# Patient Record
Sex: FEMALE | Race: WHITE | NOT HISPANIC OR LATINO | URBAN - METROPOLITAN AREA
[De-identification: names, ages, dates, MRNs, and addresses within clinical notes are randomized per-mention and may not be internally consistent; named-entity substitution may affect disease eponyms.]

---

## 2017-05-31 ENCOUNTER — OUTPATIENT (OUTPATIENT)
Dept: OUTPATIENT SERVICES | Facility: HOSPITAL | Age: 77
LOS: 1 days | End: 2017-05-31

## 2018-01-03 ENCOUNTER — NON-APPOINTMENT (OUTPATIENT)
Age: 78
End: 2018-01-03

## 2018-01-03 ENCOUNTER — APPOINTMENT (OUTPATIENT)
Dept: CARDIOLOGY | Facility: CLINIC | Age: 78
End: 2018-01-03
Payer: MEDICARE

## 2018-01-03 VITALS — DIASTOLIC BLOOD PRESSURE: 80 MMHG | SYSTOLIC BLOOD PRESSURE: 150 MMHG

## 2018-01-03 VITALS
WEIGHT: 169 LBS | SYSTOLIC BLOOD PRESSURE: 182 MMHG | HEIGHT: 67 IN | DIASTOLIC BLOOD PRESSURE: 110 MMHG | BODY MASS INDEX: 26.53 KG/M2 | HEART RATE: 60 BPM

## 2018-01-03 DIAGNOSIS — Z86.39 PERSONAL HISTORY OF OTHER ENDOCRINE, NUTRITIONAL AND METABOLIC DISEASE: ICD-10-CM

## 2018-01-03 DIAGNOSIS — Z86.79 PERSONAL HISTORY OF OTHER DISEASES OF THE CIRCULATORY SYSTEM: ICD-10-CM

## 2018-01-03 DIAGNOSIS — Z00.00 ENCOUNTER FOR GENERAL ADULT MEDICAL EXAMINATION W/OUT ABNORMAL FINDINGS: ICD-10-CM

## 2018-01-03 PROCEDURE — 99215 OFFICE O/P EST HI 40 MIN: CPT

## 2018-01-03 PROCEDURE — 93000 ELECTROCARDIOGRAM COMPLETE: CPT

## 2018-01-22 ENCOUNTER — APPOINTMENT (OUTPATIENT)
Dept: CARDIOLOGY | Facility: CLINIC | Age: 78
End: 2018-01-22
Payer: MEDICARE

## 2018-01-22 PROCEDURE — 78452 HT MUSCLE IMAGE SPECT MULT: CPT

## 2018-01-22 PROCEDURE — A9502: CPT

## 2018-01-22 PROCEDURE — 93306 TTE W/DOPPLER COMPLETE: CPT

## 2018-01-22 PROCEDURE — 93015 CV STRESS TEST SUPVJ I&R: CPT

## 2018-01-24 ENCOUNTER — APPOINTMENT (OUTPATIENT)
Dept: CARDIOLOGY | Facility: CLINIC | Age: 78
End: 2018-01-24
Payer: MEDICARE

## 2018-01-24 VITALS
HEART RATE: 70 BPM | WEIGHT: 169 LBS | BODY MASS INDEX: 26.53 KG/M2 | HEIGHT: 67 IN | OXYGEN SATURATION: 98 % | DIASTOLIC BLOOD PRESSURE: 96 MMHG | SYSTOLIC BLOOD PRESSURE: 220 MMHG

## 2018-01-24 DIAGNOSIS — Z78.9 OTHER SPECIFIED HEALTH STATUS: ICD-10-CM

## 2018-01-24 DIAGNOSIS — Z82.49 FAMILY HISTORY OF ISCHEMIC HEART DISEASE AND OTHER DISEASES OF THE CIRCULATORY SYSTEM: ICD-10-CM

## 2018-01-24 DIAGNOSIS — Z82.3 FAMILY HISTORY OF STROKE: ICD-10-CM

## 2018-01-24 PROCEDURE — 99214 OFFICE O/P EST MOD 30 MIN: CPT

## 2018-02-05 ENCOUNTER — RESULT CHARGE (OUTPATIENT)
Age: 78
End: 2018-02-05

## 2018-02-23 ENCOUNTER — RECORD ABSTRACTING (OUTPATIENT)
Age: 78
End: 2018-02-23

## 2018-02-23 DIAGNOSIS — R07.9 CHEST PAIN, UNSPECIFIED: ICD-10-CM

## 2018-02-23 DIAGNOSIS — R53.82 CHRONIC FATIGUE, UNSPECIFIED: ICD-10-CM

## 2018-03-01 ENCOUNTER — APPOINTMENT (OUTPATIENT)
Dept: CARDIOLOGY | Facility: CLINIC | Age: 78
End: 2018-03-01

## 2018-07-19 ENCOUNTER — RECORD ABSTRACTING (OUTPATIENT)
Age: 78
End: 2018-07-19

## 2018-07-19 ENCOUNTER — APPOINTMENT (OUTPATIENT)
Dept: CARDIOLOGY | Facility: CLINIC | Age: 78
End: 2018-07-19
Payer: MEDICARE

## 2018-07-19 VITALS
BODY MASS INDEX: 26.06 KG/M2 | WEIGHT: 166 LBS | DIASTOLIC BLOOD PRESSURE: 74 MMHG | HEART RATE: 66 BPM | SYSTOLIC BLOOD PRESSURE: 118 MMHG | OXYGEN SATURATION: 96 % | HEIGHT: 67 IN

## 2018-07-19 DIAGNOSIS — R00.2 PALPITATIONS: ICD-10-CM

## 2018-07-19 DIAGNOSIS — K21.9 GASTRO-ESOPHAGEAL REFLUX DISEASE W/OUT ESOPHAGITIS: ICD-10-CM

## 2018-07-19 PROCEDURE — 99214 OFFICE O/P EST MOD 30 MIN: CPT

## 2018-07-19 PROCEDURE — 93000 ELECTROCARDIOGRAM COMPLETE: CPT

## 2018-07-19 RX ORDER — SIMVASTATIN 20 MG/1
20 TABLET, FILM COATED ORAL DAILY
Qty: 90 | Refills: 0 | Status: ACTIVE | COMMUNITY

## 2018-07-19 RX ORDER — LOSARTAN POTASSIUM 100 MG/1
100 TABLET, FILM COATED ORAL DAILY
Qty: 90 | Refills: 0 | Status: ACTIVE | COMMUNITY

## 2018-07-19 RX ORDER — HYDROCHLOROTHIAZIDE AND TRIAMTERENE 25; 37.5 MG/1; MG/1
37.5-25 CAPSULE ORAL DAILY
Refills: 0 | Status: DISCONTINUED | COMMUNITY
End: 2018-07-19

## 2018-07-19 RX ORDER — ASPIRIN ENTERIC COATED TABLETS 81 MG 81 MG/1
81 TABLET, DELAYED RELEASE ORAL DAILY
Refills: 0 | Status: ACTIVE | COMMUNITY
Start: 2018-07-19

## 2018-07-19 RX ORDER — ASPIRIN 81 MG
81 TABLET, DELAYED RELEASE (ENTERIC COATED) ORAL
Refills: 0 | Status: DISCONTINUED | COMMUNITY
End: 2018-07-19

## 2018-07-19 RX ORDER — DIPHENOXYLATE HYDROCHLORIDE AND ATROPINE SULFATE 2.5; .025 MG/1; MG/1
TABLET ORAL
Refills: 0 | Status: DISCONTINUED | COMMUNITY
End: 2018-07-19

## 2018-10-02 ENCOUNTER — APPOINTMENT (OUTPATIENT)
Dept: CARDIOLOGY | Facility: CLINIC | Age: 78
End: 2018-10-02
Payer: MEDICARE

## 2018-10-02 PROCEDURE — 93880 EXTRACRANIAL BILAT STUDY: CPT

## 2018-10-02 PROCEDURE — 93306 TTE W/DOPPLER COMPLETE: CPT

## 2018-10-05 PROCEDURE — 93224 XTRNL ECG REC UP TO 48 HRS: CPT

## 2018-10-08 ENCOUNTER — RECORD ABSTRACTING (OUTPATIENT)
Age: 78
End: 2018-10-08

## 2018-10-11 ENCOUNTER — APPOINTMENT (OUTPATIENT)
Dept: CARDIOLOGY | Facility: CLINIC | Age: 78
End: 2018-10-11
Payer: MEDICARE

## 2018-10-11 VITALS
BODY MASS INDEX: 26.06 KG/M2 | WEIGHT: 166 LBS | HEIGHT: 67 IN | DIASTOLIC BLOOD PRESSURE: 70 MMHG | HEART RATE: 68 BPM | OXYGEN SATURATION: 97 % | SYSTOLIC BLOOD PRESSURE: 112 MMHG

## 2018-10-11 DIAGNOSIS — Z87.898 PERSONAL HISTORY OF OTHER SPECIFIED CONDITIONS: ICD-10-CM

## 2018-10-11 PROCEDURE — 99214 OFFICE O/P EST MOD 30 MIN: CPT

## 2019-02-14 ENCOUNTER — OUTPATIENT (OUTPATIENT)
Dept: OUTPATIENT SERVICES | Facility: HOSPITAL | Age: 79
LOS: 1 days | End: 2019-02-14

## 2019-03-28 ENCOUNTER — OUTPATIENT (OUTPATIENT)
Dept: OUTPATIENT SERVICES | Facility: HOSPITAL | Age: 79
LOS: 1 days | End: 2019-03-28

## 2019-04-01 ENCOUNTER — RECORD ABSTRACTING (OUTPATIENT)
Age: 79
End: 2019-04-01

## 2019-04-03 ENCOUNTER — APPOINTMENT (OUTPATIENT)
Dept: CARDIOLOGY | Facility: CLINIC | Age: 79
End: 2019-04-03

## 2020-01-13 ENCOUNTER — NON-APPOINTMENT (OUTPATIENT)
Age: 80
End: 2020-01-13

## 2020-01-13 ENCOUNTER — APPOINTMENT (OUTPATIENT)
Dept: CARDIOLOGY | Facility: CLINIC | Age: 80
End: 2020-01-13
Payer: MEDICARE

## 2020-01-13 VITALS
DIASTOLIC BLOOD PRESSURE: 68 MMHG | SYSTOLIC BLOOD PRESSURE: 116 MMHG | WEIGHT: 171 LBS | BODY MASS INDEX: 26.84 KG/M2 | OXYGEN SATURATION: 97 % | HEIGHT: 67 IN | HEART RATE: 87 BPM

## 2020-01-13 PROCEDURE — 99214 OFFICE O/P EST MOD 30 MIN: CPT

## 2020-01-13 PROCEDURE — 93000 ELECTROCARDIOGRAM COMPLETE: CPT

## 2020-01-13 RX ORDER — OMEPRAZOLE 20 MG/1
20 CAPSULE, DELAYED RELEASE ORAL DAILY
Qty: 90 | Refills: 1 | Status: DISCONTINUED | COMMUNITY
End: 2020-01-13

## 2020-01-13 RX ORDER — HYDROCHLOROTHIAZIDE 12.5 MG/1
12.5 TABLET ORAL DAILY
Qty: 90 | Refills: 1 | Status: DISCONTINUED | COMMUNITY
Start: 2018-07-19 | End: 2020-01-13

## 2020-07-27 ENCOUNTER — OUTPATIENT (OUTPATIENT)
Dept: OUTPATIENT SERVICES | Facility: HOSPITAL | Age: 80
LOS: 1 days | End: 2020-07-27

## 2020-08-07 ENCOUNTER — APPOINTMENT (OUTPATIENT)
Dept: DISASTER EMERGENCY | Facility: CLINIC | Age: 80
End: 2020-08-07

## 2020-10-05 ENCOUNTER — APPOINTMENT (OUTPATIENT)
Dept: CARDIOLOGY | Facility: CLINIC | Age: 80
End: 2020-10-05

## 2020-10-16 ENCOUNTER — APPOINTMENT (OUTPATIENT)
Dept: CARDIOLOGY | Facility: CLINIC | Age: 80
End: 2020-10-16
Payer: MEDICARE

## 2020-10-16 ENCOUNTER — NON-APPOINTMENT (OUTPATIENT)
Age: 80
End: 2020-10-16

## 2020-10-16 VITALS — DIASTOLIC BLOOD PRESSURE: 90 MMHG | SYSTOLIC BLOOD PRESSURE: 176 MMHG

## 2020-10-16 VITALS
TEMPERATURE: 98.6 F | OXYGEN SATURATION: 95 % | SYSTOLIC BLOOD PRESSURE: 200 MMHG | HEART RATE: 81 BPM | HEIGHT: 67 IN | BODY MASS INDEX: 27.47 KG/M2 | DIASTOLIC BLOOD PRESSURE: 96 MMHG | WEIGHT: 175 LBS

## 2020-10-16 PROCEDURE — 93000 ELECTROCARDIOGRAM COMPLETE: CPT

## 2020-10-16 PROCEDURE — 99215 OFFICE O/P EST HI 40 MIN: CPT | Mod: 25

## 2020-10-16 NOTE — DISCUSSION/SUMMARY
[FreeTextEntry1] : 1. HTN - uncontrolled. Recommend continuing Norvasc 5 mg PO daily and losartan 100 mg PO daily. Recommend increasing Toprol XL from 50mg daily back to BID dosing (high risk medication with no signs of toxicity). \par \par 2. HLD - on simvastatin 20 mg PO daily.\par \par 3. Mild MR, mild AI: periodic echo surveillance.\par \par 4. Atherosclerosis of the carotid artery disease: no hx of CVA. Continue simvastatin 20mg daily.\par \par 5. PACs: asymptomatic. On beta blocker therapy. \par \par Follow up in 2 months.

## 2020-10-16 NOTE — HISTORY OF PRESENT ILLNESS
[FreeTextEntry1] : Historical Perspective:\par 80 year old female with PMHx of HTN, HLD, PACs and mild valvular regurgitation presents for routine follow up.  Previously she was noted to have mild-moderate AI/MR in the setting of poorly controlled BP.  After control of BP, echo was repeated which revealed only mild valvular disease.  \par \par Current Health Status:\par Patient with no chest pain, SOB, or palpitations. No hospitalizations since seeing me last. Remains compliant with his medications and reports no adverse effects. Patient states that over the past few months her BP has been elevated. She feels anxious and believes this to be the reason why her BP spikes high at times. She states she had her PCP increase her Toprol XL from 50mg daily to BID dosing. She did this and her BP became well controlled. Patient then thought she could reduce the dose back down to daily dosing because her BP was so well controlled.

## 2020-10-16 NOTE — PHYSICAL EXAM
[General Appearance - Well Developed] : well developed [Normal Appearance] : normal appearance [Well Groomed] : well groomed [General Appearance - Well Nourished] : well nourished [No Deformities] : no deformities [General Appearance - In No Acute Distress] : no acute distress [Normal Conjunctiva] : the conjunctiva exhibited no abnormalities [Normal Oral Mucosa] : normal oral mucosa [Eyelids - No Xanthelasma] : the eyelids demonstrated no xanthelasmas [No Oral Pallor] : no oral pallor [No Oral Cyanosis] : no oral cyanosis [Respiration, Rhythm And Depth] : normal respiratory rhythm and effort [Exaggerated Use Of Accessory Muscles For Inspiration] : no accessory muscle use [Heart Rate And Rhythm] : heart rate and rhythm were normal [Auscultation Breath Sounds / Voice Sounds] : lungs were clear to auscultation bilaterally [Heart Sounds] : normal S1 and S2 [Murmurs] : no murmurs present [Abnormal Walk] : normal gait [Nail Clubbing] : no clubbing of the fingernails [Gait - Sufficient For Exercise Testing] : the gait was sufficient for exercise testing [Cyanosis, Localized] : no localized cyanosis [Petechial Hemorrhages (___cm)] : no petechial hemorrhages [] : no rash [Skin Color & Pigmentation] : normal skin color and pigmentation [No Venous Stasis] : no venous stasis [Skin Lesions] : no skin lesions [No Skin Ulcers] : no skin ulcer [No Xanthoma] : no  xanthoma was observed [Oriented To Time, Place, And Person] : oriented to person, place, and time [Affect] : the affect was normal [Mood] : the mood was normal [No Anxiety] : not feeling anxious [Edema] : no peripheral edema present [FreeTextEntry1] : No JVD, no carotid artery bruits auscultated bilaterally

## 2020-11-02 ENCOUNTER — APPOINTMENT (OUTPATIENT)
Dept: CARDIOLOGY | Facility: CLINIC | Age: 80
End: 2020-11-02
Payer: MEDICARE

## 2020-11-02 PROCEDURE — 93306 TTE W/DOPPLER COMPLETE: CPT

## 2020-12-14 ENCOUNTER — APPOINTMENT (OUTPATIENT)
Dept: CARDIOLOGY | Facility: CLINIC | Age: 80
End: 2020-12-14
Payer: MEDICARE

## 2020-12-14 VITALS — DIASTOLIC BLOOD PRESSURE: 84 MMHG | SYSTOLIC BLOOD PRESSURE: 184 MMHG

## 2020-12-14 VITALS
HEIGHT: 67 IN | OXYGEN SATURATION: 97 % | BODY MASS INDEX: 28.41 KG/M2 | TEMPERATURE: 96.9 F | SYSTOLIC BLOOD PRESSURE: 196 MMHG | DIASTOLIC BLOOD PRESSURE: 90 MMHG | WEIGHT: 181 LBS | HEART RATE: 66 BPM

## 2020-12-14 PROCEDURE — 99215 OFFICE O/P EST HI 40 MIN: CPT

## 2020-12-14 RX ORDER — LABETALOL HYDROCHLORIDE 200 MG/1
200 TABLET, FILM COATED ORAL
Qty: 180 | Refills: 3 | Status: ACTIVE | COMMUNITY
Start: 2020-12-14 | End: 1900-01-01

## 2020-12-14 RX ORDER — METOPROLOL SUCCINATE 50 MG/1
50 TABLET, EXTENDED RELEASE ORAL TWICE DAILY
Refills: 1 | Status: DISCONTINUED | COMMUNITY
End: 2020-12-14

## 2020-12-14 NOTE — HISTORY OF PRESENT ILLNESS
[FreeTextEntry1] : Historical Perspective:\par 80 year old female with PMHx of HTN, HLD, PACs and mild valvular regurgitation presents for routine follow up.  Previously she was noted to have mild-moderate AI/MR in the setting of poorly controlled BP.  After control of BP, echo was repeated which revealed only mild valvular disease.  \par \par Current Health Status:\par Patient with no chest pain, SOB, or palpitations. No hospitalizations since seeing me last. Remains compliant with his medications and reports no adverse effects. BP remains elevated.

## 2020-12-14 NOTE — DISCUSSION/SUMMARY
[FreeTextEntry1] : 1. HTN - uncontrolled. Recommend continuing Norvasc 5 mg PO daily and losartan 100 mg PO daily. Recommend discontinuing Toprol XL 50mg BID dosing (high risk medication with no signs of toxicity). Instead of metoprolol will prescribe Labetalol 200mg BID.\par \par 2. HLD - on simvastatin 20 mg PO daily.\par \par 3. Mild MR, mild-moderate AI: periodic echo surveillance.\par \par 4. Atherosclerosis of the carotid artery disease: no hx of CVA. Continue simvastatin 20mg daily.\par \par 5. PACs: asymptomatic. On beta blocker therapy. \par \par Follow up in 3 weeks for BP check.

## 2021-01-11 ENCOUNTER — APPOINTMENT (OUTPATIENT)
Dept: CARDIOLOGY | Facility: CLINIC | Age: 81
End: 2021-01-11

## 2021-04-06 ENCOUNTER — APPOINTMENT (OUTPATIENT)
Dept: CARDIOLOGY | Facility: CLINIC | Age: 81
End: 2021-04-06

## 2021-09-10 ENCOUNTER — NON-APPOINTMENT (OUTPATIENT)
Age: 81
End: 2021-09-10

## 2021-09-10 ENCOUNTER — APPOINTMENT (OUTPATIENT)
Dept: OPHTHALMOLOGY | Facility: CLINIC | Age: 81
End: 2021-09-10
Payer: MEDICARE

## 2021-09-10 PROCEDURE — 92014 COMPRE OPH EXAM EST PT 1/>: CPT

## 2022-04-22 ENCOUNTER — NON-APPOINTMENT (OUTPATIENT)
Age: 82
End: 2022-04-22

## 2022-04-22 ENCOUNTER — APPOINTMENT (OUTPATIENT)
Dept: CARDIOLOGY | Facility: CLINIC | Age: 82
End: 2022-04-22
Payer: MEDICARE

## 2022-04-22 VITALS
SYSTOLIC BLOOD PRESSURE: 196 MMHG | BODY MASS INDEX: 27.47 KG/M2 | HEART RATE: 68 BPM | OXYGEN SATURATION: 96 % | WEIGHT: 175 LBS | HEIGHT: 67 IN | DIASTOLIC BLOOD PRESSURE: 96 MMHG

## 2022-04-22 DIAGNOSIS — R94.31 ABNORMAL ELECTROCARDIOGRAM [ECG] [EKG]: ICD-10-CM

## 2022-04-22 DIAGNOSIS — I49.1 ATRIAL PREMATURE DEPOLARIZATION: ICD-10-CM

## 2022-04-22 PROCEDURE — 99215 OFFICE O/P EST HI 40 MIN: CPT | Mod: 25

## 2022-04-22 PROCEDURE — 93000 ELECTROCARDIOGRAM COMPLETE: CPT

## 2022-04-22 RX ORDER — AMLODIPINE BESYLATE 5 MG/1
5 TABLET ORAL
Qty: 90 | Refills: 1 | Status: DISCONTINUED | COMMUNITY
Start: 2018-01-24 | End: 2022-04-22

## 2022-04-22 NOTE — CARDIOLOGY SUMMARY
[de-identified] : 4/22/2022, NSR, non-specific T wave changes. [de-identified] : 11/2/2020, mild MR, mild-moderate AI, mild TR with estimated PASP of 42mmmHg. LVEF 60-65%.  [de-identified] : 10/2/2018, Carotid Duplex: mild non-obstructive ICA disease bilaterally.

## 2022-04-22 NOTE — REVIEW OF SYSTEMS
[Joint Pain] : joint pain [Joint Stiffness] : joint stiffness [Negative] : Neurological [FreeTextEntry5] : No carotid bruits auscultated bilaterally

## 2022-04-22 NOTE — HISTORY OF PRESENT ILLNESS
[FreeTextEntry1] : Historical Perspective:\par 82 year old female with PMHx of HTN, HLD, PACs and mild valvular regurgitation presents for routine follow up. Previously she was noted to have mild-moderate AI/MR in the setting of poorly controlled BP. After control of BP, echo was repeated which revealed only mild valvular disease. \par \par Current Health Status:\par Patient with no chest pain, SOB, or palpitations. No hospitalizations since seeing me last. Remains compliant with his medications and reports no adverse effects. BP remains elevated. Non-compliant with office follow ups.\par

## 2022-04-22 NOTE — DISCUSSION/SUMMARY
[FreeTextEntry1] : 1. HTN - uncontrolled. Recommend discontinuing Norvasc 5 mg PO daily and starting Nifedipine ER 30mg daily. Continue losartan 100mg daily and Labetalol 200mg BID (high risk medication with no signs of toxicity). Recommend low salt diet. Patient with resistant HTN. Recommend CTA of the abdomen with contrast to evaluate for BALAJI.\par \par 2. HLD - on simvastatin 20 mg PO daily.\par \par 3. Mild MR, mild-moderate AI: periodic echo surveillance.\par \par 4. Atherosclerosis of the carotid artery disease: no hx of CVA. Continue simvastatin 20mg daily.\par \par 5. PACs: asymptomatic. On beta blocker therapy. \par \par Follow up in 6 weeks.

## 2022-04-22 NOTE — PHYSICAL EXAM
[Normal] : moves all extremities, no focal deficits, normal speech [de-identified] : No carotid bruits auscultated bilaterally

## 2022-04-27 ENCOUNTER — NON-APPOINTMENT (OUTPATIENT)
Age: 82
End: 2022-04-27

## 2022-05-13 ENCOUNTER — APPOINTMENT (OUTPATIENT)
Dept: CARDIOLOGY | Facility: CLINIC | Age: 82
End: 2022-05-13
Payer: MEDICARE

## 2022-05-13 PROCEDURE — 93306 TTE W/DOPPLER COMPLETE: CPT

## 2022-05-27 ENCOUNTER — APPOINTMENT (OUTPATIENT)
Dept: CARDIOLOGY | Facility: CLINIC | Age: 82
End: 2022-05-27
Payer: MEDICARE

## 2022-05-27 VITALS
DIASTOLIC BLOOD PRESSURE: 86 MMHG | HEIGHT: 67 IN | HEART RATE: 65 BPM | BODY MASS INDEX: 27.47 KG/M2 | SYSTOLIC BLOOD PRESSURE: 160 MMHG | OXYGEN SATURATION: 96 % | WEIGHT: 175 LBS

## 2022-05-27 PROCEDURE — 99215 OFFICE O/P EST HI 40 MIN: CPT

## 2022-05-27 NOTE — CARDIOLOGY SUMMARY
[de-identified] : 4/22/2022, NSR, non-specific T wave changes. [de-identified] : 5/13/2022, LV EF 60%, mild MR, mild-moderate AI, mild TR with estimated PASP of 25mmHg.\par 11/2/2020, mild MR, mild-moderate AI, mild TR with estimated PASP of 42mmmHg. LVEF 60-65%.  [de-identified] : 10/2/2018, Carotid Duplex: mild non-obstructive ICA disease bilaterally.

## 2022-05-27 NOTE — PHYSICAL EXAM
[Normal] : moves all extremities, no focal deficits, normal speech [de-identified] : No carotid bruits auscultated bilaterally

## 2022-05-27 NOTE — HISTORY OF PRESENT ILLNESS
[FreeTextEntry1] : Historical Perspective:\par 82 year old female with PMHx of HTN, HLD, PACs and mild valvular regurgitation presents for routine follow up. Previously she was noted to have mild-moderate AI/MR in the setting of poorly controlled BP. After control of BP, echo was repeated which revealed only mild valvular disease. \par \par Current Health Status:\par Patient with no chest pain, SOB, or palpitations. No hospitalizations since seeing me last. Remains compliant with his medications and reports no adverse effects. BP at home 110-120s systolic. Nervous when going to doctor visits. Patient underwent CTA of abdomen and pelvis which was negative for renal artery stenosis, however mass in around gallbladder found incidentally. Patient states she follow up with her PCP who ordered MRI of abdomen and patient is going to see oncologist soon.\par

## 2022-05-27 NOTE — DISCUSSION/SUMMARY
[FreeTextEntry1] : 1. HTN - controlled at home. Component of white coat HTN.  Recommend continuing Nifedipine ER 30mg daily. Continue losartan 100mg daily and Labetalol 200mg BID (high risk medication with no signs of toxicity). Recommend low salt diet. Patient with resistant HTN. Recommended CTA of the abdomen with contrast to evaluate for BALAJI.  Patient underwent CTA of abdomen and pelvis which was negative for renal artery stenosis, however mass in around gallbladder found incidentally. Patient states she follow up with her PCP who ordered MRI of abdomen and patient is going to see oncologist soon.\par \par 2. HLD - on simvastatin 20 mg PO daily.\par \par 3. Mild MR, mild-moderate AI: periodic echo surveillance.\par \par 4. Atherosclerosis of the carotid artery disease: no hx of CVA. Continue simvastatin 20mg daily.\par \par 5. PACs: asymptomatic. On beta blocker therapy. \par \par Follow up in 6 months.

## 2022-05-31 ENCOUNTER — OUTPATIENT (OUTPATIENT)
Dept: OUTPATIENT SERVICES | Facility: HOSPITAL | Age: 82
LOS: 1 days | End: 2022-05-31
Payer: MEDICARE

## 2022-05-31 DIAGNOSIS — K76.89 OTHER SPECIFIED DISEASES OF LIVER: ICD-10-CM

## 2022-06-01 ENCOUNTER — RESULT REVIEW (OUTPATIENT)
Age: 82
End: 2022-06-01

## 2022-06-01 ENCOUNTER — APPOINTMENT (OUTPATIENT)
Dept: HEMATOLOGY ONCOLOGY | Facility: CLINIC | Age: 82
End: 2022-06-01
Payer: MEDICARE

## 2022-06-01 VITALS
BODY MASS INDEX: 27.2 KG/M2 | SYSTOLIC BLOOD PRESSURE: 179 MMHG | HEIGHT: 67 IN | OXYGEN SATURATION: 94 % | TEMPERATURE: 97.2 F | HEART RATE: 64 BPM | DIASTOLIC BLOOD PRESSURE: 83 MMHG | WEIGHT: 173.28 LBS

## 2022-06-01 LAB
BASOPHILS # BLD AUTO: 0.04 K/UL — SIGNIFICANT CHANGE UP (ref 0–0.2)
BASOPHILS NFR BLD AUTO: 0.8 % — SIGNIFICANT CHANGE UP (ref 0–2)
EOSINOPHIL # BLD AUTO: 0.25 K/UL — SIGNIFICANT CHANGE UP (ref 0–0.5)
EOSINOPHIL NFR BLD AUTO: 4.9 % — SIGNIFICANT CHANGE UP (ref 0–6)
HCT VFR BLD CALC: 46.4 % — HIGH (ref 34.5–45)
HGB BLD-MCNC: 15.6 G/DL — HIGH (ref 11.5–15.5)
IMM GRANULOCYTES NFR BLD AUTO: 0.6 % — SIGNIFICANT CHANGE UP (ref 0–1.5)
LYMPHOCYTES # BLD AUTO: 1.24 K/UL — SIGNIFICANT CHANGE UP (ref 1–3.3)
LYMPHOCYTES # BLD AUTO: 24.5 % — SIGNIFICANT CHANGE UP (ref 13–44)
MCHC RBC-ENTMCNC: 31.6 PG — SIGNIFICANT CHANGE UP (ref 27–34)
MCHC RBC-ENTMCNC: 33.6 GM/DL — SIGNIFICANT CHANGE UP (ref 32–36)
MCV RBC AUTO: 94.1 FL — SIGNIFICANT CHANGE UP (ref 80–100)
MONOCYTES # BLD AUTO: 0.47 K/UL — SIGNIFICANT CHANGE UP (ref 0–0.9)
MONOCYTES NFR BLD AUTO: 9.3 % — SIGNIFICANT CHANGE UP (ref 2–14)
NEUTROPHILS # BLD AUTO: 3.04 K/UL — SIGNIFICANT CHANGE UP (ref 1.8–7.4)
NEUTROPHILS NFR BLD AUTO: 59.9 % — SIGNIFICANT CHANGE UP (ref 43–77)
NRBC # BLD: 0 /100 WBCS — SIGNIFICANT CHANGE UP (ref 0–0)
PLATELET # BLD AUTO: 206 K/UL — SIGNIFICANT CHANGE UP (ref 150–400)
RBC # BLD: 4.93 M/UL — SIGNIFICANT CHANGE UP (ref 3.8–5.2)
RBC # FLD: 12.7 % — SIGNIFICANT CHANGE UP (ref 10.3–14.5)
WBC # BLD: 5.07 K/UL — SIGNIFICANT CHANGE UP (ref 3.8–10.5)
WBC # FLD AUTO: 5.07 K/UL — SIGNIFICANT CHANGE UP (ref 3.8–10.5)

## 2022-06-01 PROCEDURE — 85027 COMPLETE CBC AUTOMATED: CPT

## 2022-06-01 PROCEDURE — 99205 OFFICE O/P NEW HI 60 MIN: CPT

## 2022-06-01 PROCEDURE — 36415 COLL VENOUS BLD VENIPUNCTURE: CPT

## 2022-06-01 NOTE — CONSULT LETTER
[Dear  ___] : Dear  [unfilled], [Consult Letter:] : I had the pleasure of evaluating your patient, [unfilled]. [Please see my note below.] : Please see my note below. [Consult Closing:] : Thank you very much for allowing me to participate in the care of this patient.  If you have any questions, please do not hesitate to contact me. [Sincerely,] : Sincerely, [FreeTextEntry2] : Dr. Viki Izquierdo\par  [FreeTextEntry3] : Cornelius Barrera MD\par  [DrJing  ___] : Dr. VILLASENOR

## 2022-06-01 NOTE — HISTORY OF PRESENT ILLNESS
[de-identified] : Ms. Echavarria was recently evaluated for renal artery stenosis. A CT scan was concerning for possible gallbladder or hepatic lesion (no BALAJI). MRI then completed at the end of May, 2022 noted a 2.6 x 2.6cm mild to moderately heterogeneously T2 hyperintense subcapsular lesion in segment 5 which bulged the capsule towards the gallbladder fossa.\par \par Also noted were pancreatic cystic foci measuring up to 1.4 cm in the body.  Hepatic lesion felt to be indeterminate- favored to represent an adenoma but evaluation limited by motion artifact.  Neuroendocrine tumor was listed on the differential. \par \par Ariadna reports no personal or family history of malignancy. She is up to date on age appropriate cancer screening. She reports no unintentional weight loss, no hot flashes, no skin changes, no flushing, no diarrhea. She rarely has a glass of wine and is a non-smoker.

## 2022-06-01 NOTE — PHYSICAL EXAM
[Fully active, able to carry on all pre-disease performance without restriction] : Status 0 - Fully active, able to carry on all pre-disease performance without restriction [Normal] : affect appropriate [de-identified] : anicteric

## 2022-06-01 NOTE — RESULTS/DATA
[FreeTextEntry1] : Ms. Echavarria is a pleasant 82 year-old woman with an incidentally noted hepatic mass seen on CT imaging, confirmed with MRI.

## 2022-06-01 NOTE — REVIEW OF SYSTEMS
[Fever] : no fever [Fatigue] : no fatigue [Recent Change In Weight] : ~T no recent weight change [Dysphagia] : no dysphagia [Odynophagia] : no odynophagia [Chest Pain] : no chest pain [Shortness Of Breath] : no shortness of breath [Abdominal Pain] : no abdominal pain [Joint Pain] : no joint pain [Joint Stiffness] : no joint stiffness [Skin Rash] : no skin rash [Fainting] : no fainting [Anxiety] : no anxiety [Depression] : no depression [Hot Flashes] : no hot flashes [Easy Bleeding] : no tendency for easy bleeding [Easy Bruising] : no tendency for easy bruising [Swollen Glands] : no swollen glands

## 2022-06-28 LAB
AFP-TM SERPL-MCNC: 1.8 NG/ML
ALBUMIN SERPL ELPH-MCNC: 4.4 G/DL
ALP BLD-CCNC: 81 U/L
ALT SERPL-CCNC: 19 U/L
ANION GAP SERPL CALC-SCNC: 13 MMOL/L
APTT BLD: 29.7 SEC
AST SERPL-CCNC: 18 U/L
BILIRUB SERPL-MCNC: 0.5 MG/DL
BUN SERPL-MCNC: 19 MG/DL
CALCIUM SERPL-MCNC: 9.3 MG/DL
CANCER AG19-9 SERPL-ACNC: <2 U/ML
CEA SERPL-MCNC: 3 NG/ML
CGA SERPL-MCNC: 55.6 NG/ML
CHLORIDE SERPL-SCNC: 103 MMOL/L
CO2 SERPL-SCNC: 24 MMOL/L
CREAT SERPL-MCNC: 0.79 MG/DL
EGFR: 75 ML/MIN/1.73M2
GLUCOSE SERPL-MCNC: 129 MG/DL
HBV CORE IGG+IGM SER QL: NONREACTIVE
HBV SURFACE AB SER QL: NONREACTIVE
HBV SURFACE AB SERPL IA-ACNC: <3 MIU/ML
HBV SURFACE AG SER QL: NONREACTIVE
HCV AB SER QL: NONREACTIVE
HCV S/CO RATIO: 0.4 S/CO
INR PPP: 1.14 RATIO
POTASSIUM SERPL-SCNC: 4.3 MMOL/L
PROT SERPL-MCNC: 6.8 G/DL
PT BLD: 13.3 SEC
SEROTONIN SERUM: 102 NG/ML
SODIUM SERPL-SCNC: 141 MMOL/L

## 2022-08-12 ENCOUNTER — OUTPATIENT (OUTPATIENT)
Dept: OUTPATIENT SERVICES | Facility: HOSPITAL | Age: 82
LOS: 1 days | End: 2022-08-12

## 2022-08-12 DIAGNOSIS — K76.89 OTHER SPECIFIED DISEASES OF LIVER: ICD-10-CM

## 2022-08-15 ENCOUNTER — APPOINTMENT (OUTPATIENT)
Dept: HEMATOLOGY ONCOLOGY | Facility: CLINIC | Age: 82
End: 2022-08-15

## 2022-08-15 VITALS
TEMPERATURE: 97.3 F | HEART RATE: 58 BPM | DIASTOLIC BLOOD PRESSURE: 72 MMHG | WEIGHT: 175 LBS | BODY MASS INDEX: 27.41 KG/M2 | SYSTOLIC BLOOD PRESSURE: 135 MMHG | OXYGEN SATURATION: 95 %

## 2022-08-15 PROCEDURE — 99213 OFFICE O/P EST LOW 20 MIN: CPT

## 2022-09-23 ENCOUNTER — NON-APPOINTMENT (OUTPATIENT)
Age: 82
End: 2022-09-23

## 2022-09-23 ENCOUNTER — APPOINTMENT (OUTPATIENT)
Dept: OPHTHALMOLOGY | Facility: CLINIC | Age: 82
End: 2022-09-23

## 2022-09-23 PROCEDURE — 92014 COMPRE OPH EXAM EST PT 1/>: CPT

## 2022-09-23 NOTE — PHYSICAL EXAM
[Fully active, able to carry on all pre-disease performance without restriction] : Status 0 - Fully active, able to carry on all pre-disease performance without restriction [Normal] : affect appropriate [de-identified] : anicteric

## 2022-09-23 NOTE — HISTORY OF PRESENT ILLNESS
[de-identified] : Ms. Echavarria was recently evaluated for renal artery stenosis. A CT scan was concerning for possible gallbladder or hepatic lesion (no BALAJI). MRI then completed at the end of May, 2022 noted a 2.6 x 2.6cm mild to moderately heterogeneously T2 hyperintense subcapsular lesion in segment 5 which bulged the capsule towards the gallbladder fossa.\par \par Also noted were pancreatic cystic foci measuring up to 1.4 cm in the body.  Hepatic lesion felt to be indeterminate- favored to represent an adenoma but evaluation limited by motion artifact.  Neuroendocrine tumor was listed on the differential. \par \par Ariadna reports no personal or family history of malignancy. She is up to date on age appropriate cancer screening. She reports no unintentional weight loss, no hot flashes, no skin changes, no flushing, no diarrhea. She rarely has a glass of wine and is a non-smoker. [de-identified] : CA 19-9, CEA and AFP were all negative at last visit chromogranin A and serotonin levels were also normal.

## 2022-09-30 ENCOUNTER — APPOINTMENT (OUTPATIENT)
Dept: UROGYNECOLOGY | Facility: CLINIC | Age: 82
End: 2022-09-30

## 2022-09-30 VITALS
DIASTOLIC BLOOD PRESSURE: 79 MMHG | BODY MASS INDEX: 27.47 KG/M2 | HEIGHT: 67 IN | WEIGHT: 175 LBS | SYSTOLIC BLOOD PRESSURE: 137 MMHG

## 2022-09-30 DIAGNOSIS — N39.41 URGE INCONTINENCE: ICD-10-CM

## 2022-09-30 PROCEDURE — 81003 URINALYSIS AUTO W/O SCOPE: CPT | Mod: QW

## 2022-09-30 PROCEDURE — 99205 OFFICE O/P NEW HI 60 MIN: CPT

## 2022-09-30 NOTE — PHYSICAL EXAM
[Chaperone Present] : A chaperone was present in the examining room during all aspects of the physical examination [FreeTextEntry1] : General: Not in acute distress, alert and oriented x3.\par Neck: Supple. No lymphadenopathy. \par Abdomen: Soft, nontender, and nondistended. No obvious hepatosplenomegaly. No obvious hernias. \par Pelvic Exam: Normal external female genitalia. Saddle sensory exam S2 to S4 is intact. Perineal reflexes not visualized. Urethra is hypermobile without prolapse, exudates, or lesions. Cough stress test is negative.  Post void residual was checked with I/O cath and was 100 cc of clear urine. Pale and mildly atrophic-appearing vaginal epithelium. No vaginal blood or discharge. Cervix without abnormal lesions, flushed with vagina. Bimanual exam reveals a small uterus in normal positioning. No adnexal masses or tenderness. Levator ani contraction is 1/5. Rectovaginal exam: perineal pocket present. No enterocele or rectal prolapse appreciated. Significantly decreased resting and active anal sphincter tone\par POPQ: Aa -3,  Ba -3, C -6, TVL 9, D-7, GH 3, PB 4, Ap 0, Bp 0.\par \par

## 2022-09-30 NOTE — HISTORY OF PRESENT ILLNESS
[FreeTextEntry1] : Patient is a 82 years old P6 ( x6) who is referred by Dr. Viki Izquierdo for evaluation and management of nocturia and urinary incontinence\par Patient reports waking up 2-3 times at night. Sometimes, she is unable to make it to the restroom and leaks urine. She voids q 2 hrs to avoid leakage during the day time. She wears pads continously \par Denies leaking with coughing, sneezing etc\par She used a medicine which caused dry mouth and didn't help much\par Denies hx of frequent UTIs\par Denies hx of nephrolithiasis\par Daily fluid intake: 1 cup of coffee, water, sometimes flavored water or ice coffee\par Denies sensation of vaginal bulge or pressure. Sexually active without dryness/ pain\par Bowel symptoms: Reports hx of fecal incontinence without any urgency. She is now using diphenoxylate-atropine which has resolved\par \par \par GYN Hx:Denies hx of PMB. Denies hx of abnormal mammo or pap smears. Hasn't seen GYN since past 12 years\par \par PMHx: HTN (TAKES 3-4 PILLS), HLD, undergoing evaluation for gallbladder/ hepatic lesion, aortic valve regurg\par \par PSHx: femur surgery in 2018\par

## 2022-09-30 NOTE — ASSESSMENT
[FreeTextEntry1] : Patient is a 82-year-old multipara with symptoms of urgency urinary incontinence, overactive bladder , nocturia, fecal incontinence and stage II posterior vaginal wall prolapse. On examination, there is evidence of urethral hypermobility with a negative empty bladder supine cough stress test,  postvoid residual of 100 cc, distal posterior vaginal wall prolapse, decrease resting and active anal sphincter tone , and poor levator ani awareness/contraction. She has no history of recurrent UTI. Potential contributing factors include  intake of multiple anti-hypertensive and diuretics.\par \par .

## 2022-09-30 NOTE — LETTER BODY
[Dear  ___] : Dear  [unfilled], [Dear  ___] : Dear ~SCOOBY, [I had the pleasure of evaluating your patient, [unfilled]. Thank you for referring Ms. [unfilled] for consultation for ___] : I had the pleasure of evaluating your patient, [unfilled]. Thank you for referring Ms. [unfilled] for consultation for [unfilled]. [Attached please find my note.] : Attached please find my note. [Thank you very much for allowing me to participate in the care of this patient. If you have any questions, please do not hesitate to contact me] : Thank you very much for allowing me to participate in the care of this patient. If you have any questions, please do not hesitate to contact me.

## 2022-09-30 NOTE — DISCUSSION/SUMMARY
[FreeTextEntry1] : The patient was counseled regarding the pathophysiology of the above conditions. A total of approximately 60 minutes was spent on this visit, greater than 50%of which was spent on counseling. She was also counseled regarding the risks, benefits, indications, and alternatives of further evaluations studies, as well as various management options. She was given verbal and written information/education on pelvic floor muscle exercises, avoidance of dietary bladder irritants, and other strategies to improve bladder control. Pharmacologic management of overactive bladder and urgency incontinence was discussed.  Also reviewed advanced treatment options for overactive bladder . She was counseled regarding treatment options for rectocele and fecal incontinence including pelvic floor PT, pessary placement and surgical management. AUGS interactive tool was used to explain normal anatomy as well as alteration in urethral support associated with prolapse.  After a detailed discussion, following management plan was outlined:\par 1.  Urodynamic testing \par 2. UA with micro and urine culture was ordered to exclude UTI.\par 3. Advised taking antihypertensive medications earlier in the day rather than close to bedtime. Night time fluid restriction and other strategies to decrease nocturia were reviewed.\par 4. 6 weeks trial of behavioral modification, bladder retraining and pelvic floor muscle therapy. she will start the home exercise program as instructed..\par 5.  She tried Solifenacin for few days and noted dry mouth.  She is on multiple medicines to control blood pressures and is not a optimal candidate for Myrbetriq.  Pending results of urodynamic testing, she appeared to be a good candidate for sacral neuromodulation due to presence of both urinary and fecal incontinence.  Provided verbal and written information regarding sacral neuromodulation.  She will return after urodynamic testing for further discussion.  \par 6.  If there is evidence of stress urinary incontinence on urodynamic testing, will recommend mid urethral sling and posterior colpoperineorrhaphy for rectocele\par 7.  Discussed treatment options of fecal incontinence.  Recommended avoidance of complex carbohydrates and use of fiber supplement/bulking agent.  Discussed supervised pelvic floor therapy/biofeedback versus sacral neuromodulation.  She will think about these options and will return for follow-up after urodynamic testing.\par 8.  UA/culture ordered to exclude urinary UTI

## 2022-10-03 LAB
APPEARANCE: CLEAR
BACTERIA UR CULT: NORMAL
BACTERIA: NEGATIVE
BILIRUBIN URINE: NEGATIVE
BLOOD URINE: NEGATIVE
COLOR: YELLOW
GLUCOSE QUALITATIVE U: NEGATIVE
HYALINE CASTS: 0 /LPF
KETONES URINE: NEGATIVE
LEUKOCYTE ESTERASE URINE: NEGATIVE
MICROSCOPIC-UA: NORMAL
NITRITE URINE: NEGATIVE
PH URINE: 7
PROTEIN URINE: NEGATIVE
RED BLOOD CELLS URINE: 1 /HPF
SPECIFIC GRAVITY URINE: 1.01
SQUAMOUS EPITHELIAL CELLS: 0 /HPF
UROBILINOGEN URINE: NORMAL
WHITE BLOOD CELLS URINE: 0 /HPF

## 2022-10-12 ENCOUNTER — APPOINTMENT (OUTPATIENT)
Dept: UROGYNECOLOGY | Facility: CLINIC | Age: 82
End: 2022-10-12

## 2022-10-12 ENCOUNTER — OUTPATIENT (OUTPATIENT)
Dept: OUTPATIENT SERVICES | Facility: HOSPITAL | Age: 82
LOS: 1 days | End: 2022-10-12
Payer: MEDICARE

## 2022-10-12 DIAGNOSIS — K76.89 OTHER SPECIFIED DISEASES OF LIVER: ICD-10-CM

## 2022-10-12 PROCEDURE — 51741 ELECTRO-UROFLOWMETRY FIRST: CPT

## 2022-10-12 PROCEDURE — 51784 ANAL/URINARY MUSCLE STUDY: CPT

## 2022-10-12 PROCEDURE — 51729 CYSTOMETROGRAM W/VP&UP: CPT

## 2022-10-12 PROCEDURE — 51797 INTRAABDOMINAL PRESSURE TEST: CPT

## 2022-10-14 ENCOUNTER — APPOINTMENT (OUTPATIENT)
Dept: HEMATOLOGY ONCOLOGY | Facility: CLINIC | Age: 82
End: 2022-10-14

## 2022-10-14 ENCOUNTER — RESULT REVIEW (OUTPATIENT)
Age: 82
End: 2022-10-14

## 2022-10-14 VITALS
BODY MASS INDEX: 26.78 KG/M2 | TEMPERATURE: 98.3 F | DIASTOLIC BLOOD PRESSURE: 71 MMHG | WEIGHT: 170.6 LBS | SYSTOLIC BLOOD PRESSURE: 131 MMHG | OXYGEN SATURATION: 94 % | HEIGHT: 67 IN | HEART RATE: 67 BPM

## 2022-10-14 LAB
BASOPHILS # BLD AUTO: 0.05 K/UL — SIGNIFICANT CHANGE UP (ref 0–0.2)
BASOPHILS NFR BLD AUTO: 0.9 % — SIGNIFICANT CHANGE UP (ref 0–2)
EOSINOPHIL # BLD AUTO: 0.27 K/UL — SIGNIFICANT CHANGE UP (ref 0–0.5)
EOSINOPHIL NFR BLD AUTO: 4.8 % — SIGNIFICANT CHANGE UP (ref 0–6)
HCT VFR BLD CALC: 47.7 % — HIGH (ref 34.5–45)
HGB BLD-MCNC: 15.9 G/DL — HIGH (ref 11.5–15.5)
IMM GRANULOCYTES NFR BLD AUTO: 0.5 % — SIGNIFICANT CHANGE UP (ref 0–0.9)
LYMPHOCYTES # BLD AUTO: 1.64 K/UL — SIGNIFICANT CHANGE UP (ref 1–3.3)
LYMPHOCYTES # BLD AUTO: 28.9 % — SIGNIFICANT CHANGE UP (ref 13–44)
MCHC RBC-ENTMCNC: 31.3 PG — SIGNIFICANT CHANGE UP (ref 27–34)
MCHC RBC-ENTMCNC: 33.3 GM/DL — SIGNIFICANT CHANGE UP (ref 32–36)
MCV RBC AUTO: 93.9 FL — SIGNIFICANT CHANGE UP (ref 80–100)
MONOCYTES # BLD AUTO: 0.54 K/UL — SIGNIFICANT CHANGE UP (ref 0–0.9)
MONOCYTES NFR BLD AUTO: 9.5 % — SIGNIFICANT CHANGE UP (ref 2–14)
NEUTROPHILS # BLD AUTO: 3.15 K/UL — SIGNIFICANT CHANGE UP (ref 1.8–7.4)
NEUTROPHILS NFR BLD AUTO: 55.4 % — SIGNIFICANT CHANGE UP (ref 43–77)
NRBC # BLD: 0 /100 WBCS — SIGNIFICANT CHANGE UP (ref 0–0)
PLATELET # BLD AUTO: 193 K/UL — SIGNIFICANT CHANGE UP (ref 150–400)
RBC # BLD: 5.08 M/UL — SIGNIFICANT CHANGE UP (ref 3.8–5.2)
RBC # FLD: 11.9 % — SIGNIFICANT CHANGE UP (ref 10.3–14.5)
WBC # BLD: 5.68 K/UL — SIGNIFICANT CHANGE UP (ref 3.8–10.5)
WBC # FLD AUTO: 5.68 K/UL — SIGNIFICANT CHANGE UP (ref 3.8–10.5)

## 2022-10-14 PROCEDURE — 99213 OFFICE O/P EST LOW 20 MIN: CPT

## 2022-10-14 PROCEDURE — 85027 COMPLETE CBC AUTOMATED: CPT

## 2022-10-15 LAB
ALBUMIN SERPL ELPH-MCNC: 4.5 G/DL
ALP BLD-CCNC: 75 U/L
ALT SERPL-CCNC: 21 U/L
ANION GAP SERPL CALC-SCNC: 15 MMOL/L
APTT BLD: 34.3 SEC
AST SERPL-CCNC: 19 U/L
BILIRUB SERPL-MCNC: 0.4 MG/DL
BUN SERPL-MCNC: 17 MG/DL
CALCIUM SERPL-MCNC: 9.8 MG/DL
CHLORIDE SERPL-SCNC: 104 MMOL/L
CO2 SERPL-SCNC: 26 MMOL/L
CREAT SERPL-MCNC: 0.82 MG/DL
EGFR: 72 ML/MIN/1.73M2
GLUCOSE SERPL-MCNC: 115 MG/DL
INR PPP: 1.14 RATIO
POTASSIUM SERPL-SCNC: 4.3 MMOL/L
PROT SERPL-MCNC: 7 G/DL
PT BLD: 13.5 SEC
SODIUM SERPL-SCNC: 145 MMOL/L

## 2022-10-28 ENCOUNTER — APPOINTMENT (OUTPATIENT)
Dept: UROGYNECOLOGY | Facility: CLINIC | Age: 82
End: 2022-10-28

## 2022-10-28 VITALS
WEIGHT: 170 LBS | SYSTOLIC BLOOD PRESSURE: 154 MMHG | BODY MASS INDEX: 26.68 KG/M2 | HEIGHT: 67 IN | DIASTOLIC BLOOD PRESSURE: 70 MMHG

## 2022-10-28 DIAGNOSIS — N81.6 RECTOCELE: ICD-10-CM

## 2022-10-28 PROCEDURE — 99213 OFFICE O/P EST LOW 20 MIN: CPT

## 2022-10-28 NOTE — DISCUSSION/SUMMARY
[Reviewed Clinical Lab Test(s)] : Results of clinical tests were reviewed. [Visit Time ___ Minutes] : [unfilled] minutes [Face to Face Time___ Minutes] : with [unfilled] minutes in face to face consultation. [FreeTextEntry1] : We discussed management options for stress urinary incontinence and posterior vaginal wall prolapse.  We discussed expectant management, pessary as well as surgery.  She she is bothered by the urinary leakage as well as fecal incontinence.  She is interested in post proceeding with mid urethral sling placement and posterior colpoperineorrhaphy however she is currently undergoing work-up for solid liver lesion and has a appointment next week to discuss the biopsy results.  She will return in 2 weeks.  If she needs to undergo treatment for liver mass, she will have a trial of pessary for the time being.  Otherwise she would like to be scheduled for posterior repair and sling placement

## 2022-10-28 NOTE — ASSESSMENT
[FreeTextEntry1] : Patient is a 82-year-old multipara with symptoms of urgency urinary incontinence, overactive bladder , nocturia, fecal incontinence and stage II posterior vaginal wall prolapse.  Her urodynamic testing showed presence of stress urinary incontinence, absence of urodynamic detrusor overactivity, possible voiding difficulty in the presence of pressure transducers. She was able to void without difficulty after the removal of pressure transducers.

## 2022-10-28 NOTE — HISTORY OF PRESENT ILLNESS
[FreeTextEntry1] : Patient is a 82-year-old multipara who was initially seen on 9/30/2022 for consultation regarding symptoms of urgency urinary incontinence, overactive bladder , nocturia, fecal incontinence and stage II posterior vaginal wall prolapse. On previous examination, there was evidence of urethral hypermobility with a negative empty bladder supine cough stress test, postvoid residual of 100 cc, distal posterior vaginal wall prolapse, decrease resting and active anal sphincter tone , and poor levator ani awareness/contraction. She has no history of recurrent UTI. Potential contributing factors include intake of multiple anti-hypertensive and diuretics.  She has tried Solifenacin in the past and discontinued using it due to dry mouth\par Her urine culture culture from 9/30/2022 was negative.  She had urodynamic testing on 10/12/2022 which showed findings  suggestive of inconclusive uroflow with voided volume of 10 cc and PVR of 100cc; her complex cystogram showed normal sensation, normal cystometric capacity (490 cc), normal compliance, absence of detrusor overactivity, presence of stress urinary incontinence with cough and Valsalva starting at full volumes of 300 cc and above; she voided 162 cc for voiding pressure study after being filled with for 490 cc with peak flow of 19 , pressure at peak flow of 15 cm of water, continuos flow pattern. She voided additional 200 cc in the commode after removal of the catheters.\par Patient presents today for a follow-up.  She continues to report frequent nighttime urination.  She is unable to tell me about the incontinence because she wears pads continuously.\par Patient had a liver biopsy on 10/20/2022.  She is awaiting the results of the biopsy and has an appointment next week for discussion of pathology\par \par

## 2022-11-01 ENCOUNTER — NON-APPOINTMENT (OUTPATIENT)
Age: 82
End: 2022-11-01

## 2022-11-04 ENCOUNTER — APPOINTMENT (OUTPATIENT)
Age: 82
End: 2022-11-04

## 2022-11-04 VITALS
BODY MASS INDEX: 26.74 KG/M2 | HEIGHT: 67 IN | TEMPERATURE: 96.4 F | HEART RATE: 72 BPM | OXYGEN SATURATION: 94 % | WEIGHT: 170.4 LBS

## 2022-11-04 PROCEDURE — 99214 OFFICE O/P EST MOD 30 MIN: CPT

## 2022-11-04 NOTE — PHYSICAL EXAM
[Fully active, able to carry on all pre-disease performance without restriction] : Status 0 - Fully active, able to carry on all pre-disease performance without restriction [Normal] : affect appropriate [de-identified] : anicteric [de-identified] : BS + x4. There is a palpable liver edge on exan today. NNT/ND

## 2022-11-04 NOTE — REVIEW OF SYSTEMS
[Fever] : no fever [Chills] : no chills [Fatigue] : no fatigue [Recent Change In Weight] : ~T no recent weight change [Dysphagia] : no dysphagia [Loss of Hearing] : no loss of hearing [Nosebleeds] : no nosebleeds [Odynophagia] : no odynophagia [Chest Pain] : no chest pain [Palpitations] : no palpitations [Lower Ext Edema] : no lower extremity edema [Shortness Of Breath] : no shortness of breath [Wheezing] : no wheezing [Cough] : no cough [SOB on Exertion] : no shortness of breath during exertion [Abdominal Pain] : no abdominal pain [Constipation] : no constipation [Dysmenorrhea/Abn Vaginal Bleeding] : no dysmenorrhea/abnormal vaginal bleeding [Joint Pain] : no joint pain [Joint Stiffness] : no joint stiffness [Skin Rash] : no skin rash [Skin Wound] : no skin wound [Fainting] : no fainting [Anxiety] : no anxiety [Depression] : no depression [Hot Flashes] : no hot flashes [Easy Bleeding] : no tendency for easy bleeding [Easy Bruising] : no tendency for easy bruising [Swollen Glands] : no swollen glands

## 2022-11-04 NOTE — HISTORY OF PRESENT ILLNESS
[de-identified] : Ms. Echavarria was recently evaluated for renal artery stenosis. A CT scan was concerning for possible gallbladder or hepatic lesion (no BALAJI). MRI then completed at the end of May, 2022 noted a 2.6 x 2.6cm mild to moderately heterogeneously T2 hyperintense subcapsular lesion in segment 5 which bulged the capsule towards the gallbladder fossa.\par \par Also noted were pancreatic cystic foci measuring up to 1.4 cm in the body.  Hepatic lesion felt to be indeterminate- favored to represent an adenoma but evaluation limited by motion artifact.  Neuroendocrine tumor was listed on the differential. \par \par Ariadna reports no personal or family history of malignancy. She is up to date on age appropriate cancer screening. She reports no unintentional weight loss, no hot flashes, no skin changes, no flushing, no diarrhea. She rarely has a glass of wine and is a non-smoker. [de-identified] : Ariadna is feeling well. She is scheduled for U/S liver biopsy on 10/20 at [facility]. CA 19-9, CEA and AFP were all negative at last visit chromogranin A and serotonin levels were also normal.

## 2022-11-07 ENCOUNTER — RESULT REVIEW (OUTPATIENT)
Age: 82
End: 2022-11-07

## 2022-11-14 ENCOUNTER — APPOINTMENT (OUTPATIENT)
Dept: SURGICAL ONCOLOGY | Facility: CLINIC | Age: 82
End: 2022-11-14

## 2022-11-14 VITALS
WEIGHT: 172.2 LBS | SYSTOLIC BLOOD PRESSURE: 187 MMHG | DIASTOLIC BLOOD PRESSURE: 82 MMHG | OXYGEN SATURATION: 95 % | TEMPERATURE: 97.3 F | BODY MASS INDEX: 27.03 KG/M2 | HEIGHT: 67 IN | HEART RATE: 66 BPM

## 2022-11-14 PROCEDURE — 99204 OFFICE O/P NEW MOD 45 MIN: CPT

## 2022-11-17 ENCOUNTER — NON-APPOINTMENT (OUTPATIENT)
Age: 82
End: 2022-11-17

## 2022-11-17 NOTE — RESULTS/DATA
[FreeTextEntry1] : Ms. Echavarria is a pleasant 82 year-old woman with an incidentally noted hepatic mass seen on CT imaging, confirmed with MRI. Biopsy shows HCC.

## 2022-11-17 NOTE — ASSESSMENT
[FreeTextEntry1] : This is an active, healthy 82-year-old woman with a newly diagnosed incidental finding of hepatocellular carcinoma.  The lesion is confined to segment 5 and measures 3.5 cm in greatest dimension.  A portion of the mass is exophytic just lateral to the gallbladder.  She will need a dedicated CT scan with contrast to better delineate the hepatic anatomy but this appears to be amenable to a laparoscopic resection.

## 2022-11-17 NOTE — HISTORY OF PRESENT ILLNESS
[de-identified] : Ms. Echavarria was recently evaluated for renal artery stenosis. A CT scan was concerning for possible gallbladder or hepatic lesion (no BALAJI). MRI then completed at the end of May, 2022 noted a 2.6 x 2.6cm mild to moderately heterogeneously T2 hyperintense subcapsular lesion in segment 5 which bulged the capsule towards the gallbladder fossa.\par \par Also noted were pancreatic cystic foci measuring up to 1.4 cm in the body.  Hepatic lesion felt to be indeterminate- favored to represent an adenoma but evaluation limited by motion artifact.  Neuroendocrine tumor was listed on the differential. \par \par Ariadna reports no personal or family history of malignancy. She is up to date on age appropriate cancer screening. She reports no unintentional weight loss, no hot flashes, no skin changes, no flushing, no diarrhea. She rarely has a glass of wine and is a non-smoker.\par \par CA 19-9, CEA and AFP were all negative at last visit chromogranin A and serotonin levels were also normal. [de-identified] : Pathology from liver biopsy (lillie) surprisingly reveals well to moderately differentiated hepatocellular carcinoma. She was not aware of the pathology. She denies any ongoing fevers or chills, no headache, no lumps or bumps, no weight loss, no bleeding or bruising.\par

## 2022-11-17 NOTE — REVIEW OF SYSTEMS
[Fever] : no fever [Chills] : no chills [Fatigue] : no fatigue [Recent Change In Weight] : ~T no recent weight change [Dysphagia] : no dysphagia [Loss of Hearing] : no loss of hearing [Odynophagia] : no odynophagia [Nosebleeds] : no nosebleeds [Chest Pain] : no chest pain [Palpitations] : no palpitations [Lower Ext Edema] : no lower extremity edema [Shortness Of Breath] : no shortness of breath [Wheezing] : no wheezing [Cough] : no cough [SOB on Exertion] : no shortness of breath during exertion [Abdominal Pain] : no abdominal pain [Constipation] : no constipation [Dysmenorrhea/Abn Vaginal Bleeding] : no dysmenorrhea/abnormal vaginal bleeding [Joint Pain] : no joint pain [Joint Stiffness] : no joint stiffness [Skin Rash] : no skin rash [Skin Wound] : no skin wound [Fainting] : no fainting [Anxiety] : no anxiety [Depression] : no depression [Hot Flashes] : no hot flashes [Easy Bleeding] : no tendency for easy bleeding [Easy Bruising] : no tendency for easy bruising [Swollen Glands] : no swollen glands

## 2022-11-17 NOTE — PHYSICAL EXAM
[Fully active, able to carry on all pre-disease performance without restriction] : Status 0 - Fully active, able to carry on all pre-disease performance without restriction [Normal] : normoactive bowel sounds, soft and nontender, no hepatosplenomegaly or masses appreciated [de-identified] : anicteric

## 2022-11-17 NOTE — PHYSICAL EXAM
[Normal] : supple, no neck mass and thyroid not enlarged [Normal Neck Lymph Nodes] : normal neck lymph nodes  [Normal Supraclavicular Lymph Nodes] : normal supraclavicular lymph nodes [Normal] : oriented to person, place and time, with appropriate affect [de-identified] : Not examined [de-identified] : No abdominal scars [FreeTextEntry1] : Not examined

## 2022-11-17 NOTE — HISTORY OF PRESENT ILLNESS
[de-identified] : Mrs. Echavarria is a healthy 82-year-old woman who was recently worked up for hypertension including a CT scan to rule out renal artery stenosis.  The CT scan which was done in April 25, 2022 demonstrated a hepatic nodule that was contrast enhancing measuring up to 3 cm in greatest diameter adjacent to the gallbladder the lesion was thought to be of uncertain etiology.  This was followed by an MRI on September 14, 2022 that showed that the lesion had enlarged to 3.5 x 3.4 x 3.3 cm.  It previously had measured at 2.5 x 2.6 x 2.6 cm mildly to moderately heterogeneous T2 hyperintensity nearly T1 isointense subcapsular lesion in segment 5 which bulges into the capsule towards the gallbladder fossa.  The different differential at that time included adenoma, mass forming cholangiocarcinoma and atypical liver malignancy such as a neuroendocrine tumor.  Her CEA, CA 19–9, and alpha-fetoprotein were all within normal range.  Dr. Ho arranged for an ultrasound-guided liver biopsy which demonstrated a well to moderately differentiated hepatocellular carcinoma.  He is being referred for the possible surgical resection.  She denies any history of alcohol abuse or hepatitis.  She is not aware of any diagnosis of cirrhosis.  She denies any abdominal pain, distention, or cramping.  She denies any recent weight loss, fever, or chills.  She denies any family history of cancer.

## 2022-11-23 ENCOUNTER — OUTPATIENT (OUTPATIENT)
Dept: OUTPATIENT SERVICES | Facility: HOSPITAL | Age: 82
LOS: 1 days | End: 2022-11-23
Payer: MEDICARE

## 2022-11-23 VITALS
OXYGEN SATURATION: 95 % | RESPIRATION RATE: 16 BRPM | SYSTOLIC BLOOD PRESSURE: 129 MMHG | HEIGHT: 67 IN | HEART RATE: 57 BPM | TEMPERATURE: 99 F | WEIGHT: 169.98 LBS | DIASTOLIC BLOOD PRESSURE: 66 MMHG

## 2022-11-23 DIAGNOSIS — Z98.49 CATARACT EXTRACTION STATUS, UNSPECIFIED EYE: Chronic | ICD-10-CM

## 2022-11-23 DIAGNOSIS — I38 ENDOCARDITIS, VALVE UNSPECIFIED: Chronic | ICD-10-CM

## 2022-11-23 DIAGNOSIS — Z01.818 ENCOUNTER FOR OTHER PREPROCEDURAL EXAMINATION: ICD-10-CM

## 2022-11-23 DIAGNOSIS — N81.10 CYSTOCELE, UNSPECIFIED: Chronic | ICD-10-CM

## 2022-11-23 DIAGNOSIS — C22.0 LIVER CELL CARCINOMA: ICD-10-CM

## 2022-11-23 DIAGNOSIS — K62.3 RECTAL PROLAPSE: Chronic | ICD-10-CM

## 2022-11-23 DIAGNOSIS — Z98.890 OTHER SPECIFIED POSTPROCEDURAL STATES: Chronic | ICD-10-CM

## 2022-11-23 DIAGNOSIS — I82.409 ACUTE EMBOLISM AND THROMBOSIS OF UNSPECIFIED DEEP VEINS OF UNSPECIFIED LOWER EXTREMITY: Chronic | ICD-10-CM

## 2022-11-23 LAB
ABO RH CONFIRMATION: SIGNIFICANT CHANGE UP
ALBUMIN SERPL ELPH-MCNC: 3.8 G/DL — SIGNIFICANT CHANGE UP (ref 3.3–5)
ALP SERPL-CCNC: 79 U/L — SIGNIFICANT CHANGE UP (ref 40–120)
ALT FLD-CCNC: 31 U/L — SIGNIFICANT CHANGE UP (ref 12–78)
ANION GAP SERPL CALC-SCNC: 3 MMOL/L — LOW (ref 5–17)
APPEARANCE UR: CLEAR — SIGNIFICANT CHANGE UP
APTT BLD: 30.8 SEC — SIGNIFICANT CHANGE UP (ref 27.5–35.5)
AST SERPL-CCNC: 23 U/L — SIGNIFICANT CHANGE UP (ref 15–37)
BASOPHILS # BLD AUTO: 0.05 K/UL — SIGNIFICANT CHANGE UP (ref 0–0.2)
BASOPHILS NFR BLD AUTO: 0.8 % — SIGNIFICANT CHANGE UP (ref 0–2)
BILIRUB SERPL-MCNC: 0.7 MG/DL — SIGNIFICANT CHANGE UP (ref 0.2–1.2)
BILIRUB UR-MCNC: NEGATIVE — SIGNIFICANT CHANGE UP
BUN SERPL-MCNC: 22 MG/DL — SIGNIFICANT CHANGE UP (ref 7–23)
CALCIUM SERPL-MCNC: 9.5 MG/DL — SIGNIFICANT CHANGE UP (ref 8.5–10.1)
CHLORIDE SERPL-SCNC: 107 MMOL/L — SIGNIFICANT CHANGE UP (ref 96–108)
CO2 SERPL-SCNC: 30 MMOL/L — SIGNIFICANT CHANGE UP (ref 22–31)
COLOR SPEC: YELLOW — SIGNIFICANT CHANGE UP
CREAT SERPL-MCNC: 0.87 MG/DL — SIGNIFICANT CHANGE UP (ref 0.5–1.3)
DIFF PNL FLD: NEGATIVE — SIGNIFICANT CHANGE UP
EGFR: 67 ML/MIN/1.73M2 — SIGNIFICANT CHANGE UP
EOSINOPHIL # BLD AUTO: 0.25 K/UL — SIGNIFICANT CHANGE UP (ref 0–0.5)
EOSINOPHIL NFR BLD AUTO: 3.9 % — SIGNIFICANT CHANGE UP (ref 0–6)
GLUCOSE SERPL-MCNC: 102 MG/DL — HIGH (ref 70–99)
GLUCOSE UR QL: NEGATIVE — SIGNIFICANT CHANGE UP
HCT VFR BLD CALC: 47 % — HIGH (ref 34.5–45)
HGB BLD-MCNC: 15.9 G/DL — HIGH (ref 11.5–15.5)
IMM GRANULOCYTES NFR BLD AUTO: 0.3 % — SIGNIFICANT CHANGE UP (ref 0–0.9)
INR BLD: 1.16 RATIO — SIGNIFICANT CHANGE UP (ref 0.88–1.16)
KETONES UR-MCNC: NEGATIVE — SIGNIFICANT CHANGE UP
LEUKOCYTE ESTERASE UR-ACNC: ABNORMAL
LYMPHOCYTES # BLD AUTO: 1.36 K/UL — SIGNIFICANT CHANGE UP (ref 1–3.3)
LYMPHOCYTES # BLD AUTO: 21 % — SIGNIFICANT CHANGE UP (ref 13–44)
MCHC RBC-ENTMCNC: 31.7 PG — SIGNIFICANT CHANGE UP (ref 27–34)
MCHC RBC-ENTMCNC: 33.8 GM/DL — SIGNIFICANT CHANGE UP (ref 32–36)
MCV RBC AUTO: 93.6 FL — SIGNIFICANT CHANGE UP (ref 80–100)
MONOCYTES # BLD AUTO: 0.56 K/UL — SIGNIFICANT CHANGE UP (ref 0–0.9)
MONOCYTES NFR BLD AUTO: 8.6 % — SIGNIFICANT CHANGE UP (ref 2–14)
NEUTROPHILS # BLD AUTO: 4.25 K/UL — SIGNIFICANT CHANGE UP (ref 1.8–7.4)
NEUTROPHILS NFR BLD AUTO: 65.4 % — SIGNIFICANT CHANGE UP (ref 43–77)
NITRITE UR-MCNC: NEGATIVE — SIGNIFICANT CHANGE UP
PH UR: 7 — SIGNIFICANT CHANGE UP (ref 5–8)
PLATELET # BLD AUTO: 215 K/UL — SIGNIFICANT CHANGE UP (ref 150–400)
POTASSIUM SERPL-MCNC: 4.1 MMOL/L — SIGNIFICANT CHANGE UP (ref 3.5–5.3)
POTASSIUM SERPL-SCNC: 4.1 MMOL/L — SIGNIFICANT CHANGE UP (ref 3.5–5.3)
PROT SERPL-MCNC: 7.5 GM/DL — SIGNIFICANT CHANGE UP (ref 6–8.3)
PROT UR-MCNC: NEGATIVE — SIGNIFICANT CHANGE UP
PROTHROM AB SERPL-ACNC: 13.5 SEC — HIGH (ref 10.5–13.4)
RBC # BLD: 5.02 M/UL — SIGNIFICANT CHANGE UP (ref 3.8–5.2)
RBC # FLD: 12.4 % — SIGNIFICANT CHANGE UP (ref 10.3–14.5)
SODIUM SERPL-SCNC: 140 MMOL/L — SIGNIFICANT CHANGE UP (ref 135–145)
SP GR SPEC: 1 — LOW (ref 1.01–1.02)
UROBILINOGEN FLD QL: NEGATIVE — SIGNIFICANT CHANGE UP
WBC # BLD: 6.49 K/UL — SIGNIFICANT CHANGE UP (ref 3.8–10.5)
WBC # FLD AUTO: 6.49 K/UL — SIGNIFICANT CHANGE UP (ref 3.8–10.5)

## 2022-11-23 PROCEDURE — 99214 OFFICE O/P EST MOD 30 MIN: CPT | Mod: 25

## 2022-11-23 PROCEDURE — 86850 RBC ANTIBODY SCREEN: CPT

## 2022-11-23 PROCEDURE — 93010 ELECTROCARDIOGRAM REPORT: CPT

## 2022-11-23 PROCEDURE — 85610 PROTHROMBIN TIME: CPT

## 2022-11-23 PROCEDURE — 86900 BLOOD TYPING SEROLOGIC ABO: CPT

## 2022-11-23 PROCEDURE — 93005 ELECTROCARDIOGRAM TRACING: CPT

## 2022-11-23 PROCEDURE — 85730 THROMBOPLASTIN TIME PARTIAL: CPT

## 2022-11-23 PROCEDURE — 36415 COLL VENOUS BLD VENIPUNCTURE: CPT

## 2022-11-23 PROCEDURE — 86920 COMPATIBILITY TEST SPIN: CPT

## 2022-11-23 PROCEDURE — 80053 COMPREHEN METABOLIC PANEL: CPT

## 2022-11-23 PROCEDURE — 81001 URINALYSIS AUTO W/SCOPE: CPT

## 2022-11-23 PROCEDURE — 86901 BLOOD TYPING SEROLOGIC RH(D): CPT

## 2022-11-23 PROCEDURE — 85025 COMPLETE CBC W/AUTO DIFF WBC: CPT

## 2022-11-23 NOTE — H&P PST ADULT - HEALTH CARE MAINTENANCE
Pt denies travel out of [facility] for the past 14 days Pt denies  travel internationally for the past 21 days

## 2022-11-23 NOTE — H&P PST ADULT - HISTORY OF PRESENT ILLNESS
82 year old female diagnosed to hepatocellular carcinoma; Pt said it was an incidental finding because she has HTN on multiple anti-hypertensives; imaging was done on kidneys and lesion was found on liver; she presents to PST for planned laparoscopic partial hepatectomy

## 2022-11-23 NOTE — H&P PST ADULT - CENTRAL VENOUS CATHETER
Karthik, PGY3: Patient re-evaluated at bedside and is now currently feeling better after treatment. VSS. Time was taken to answer all of patients questions and concerns. Return precaution instructions were given and patient understands and feels comfortable with disposition. no

## 2022-11-23 NOTE — H&P PST ADULT - NSICDXFAMILYHX_GEN_ALL_CORE_FT
FAMILY HISTORY:  Father  Still living? Unknown  FH: hypertension, Age at diagnosis: Age Unknown  FH: stroke, Age at diagnosis: Age Unknown    Mother  Still living? Unknown  FH: hypertension, Age at diagnosis: Age Unknown

## 2022-11-23 NOTE — H&P PST ADULT - NSICDXPASTMEDICALHX_GEN_ALL_CORE_FT
PAST MEDICAL HISTORY:  DVT, lower extremity 1960's x1 episode only; was taking birth control pills    Female bladder prolapse     Hepatocellular carcinoma     HLD (hyperlipidemia)     HTN (hypertension)     Leaky heart valve     Rectal prolapse     Thyroid nodule

## 2022-11-23 NOTE — H&P PST ADULT - ASSESSMENT
82 year old female diagnosed to hepatocellular carcinoma; Pt said it was an incidental finding because she has HTN on multiple anti-hypertensives; imaging was done on kidneys and lesion was found on liver; she presents to PST for planned laparoscopic partial hepatectomy       Plan:  1. PST instructions given ; NPO status/  instructions to be given by ASU   2. Pt instructed to take following meds on day of surgery:   3. Pt instructed to take routine evening medications unless indicated   4. Stop NSAIDS ( Aspirin Alev Motrin Mobic Diclofenac), herbal supplements , MVI , Vitamin fish oil 7 days prior to surgery  unless   directed by surgeon or cardiologist;   5. Medical Optimization  with    6. EZ wash instructions given & mupirocin instructions given  7. Labs EKG CXR as per surgeon request   8. Pt instructed to self quarantine after Covid test   9. Covid Testing scheduled Pt notified and aware  10. Pt denies covid symptoms shortness of breath fever cough     CAPRINI SCORE [CLOT]    AGE RELATED RISK FACTORS                                                       MOBILITY RELATED FACTORS  [ ] Age 41-60 years                                            (1 Point)                  [ ] Bed rest                                                        (1 Point)  [ ] Age: 61-74 years                                           (2 Points)                 [ ] Plaster cast                                                   (2 Points)  [x ] Age= 75 years                                              (3 Points)                 [ ] Bed bound for more than 72 hours                 (2 Points)    DISEASE RELATED RISK FACTORS                                               GENDER SPECIFIC FACTORS  [ ] Edema in the lower extremities                       (1 Point)                  [ ] Pregnancy                                                     (1 Point)  [ ] Varicose veins                                               (1 Point)                  [ ] Post-partum < 6 weeks                                   (1 Point)             [x ] BMI > 25 Kg/m2                                            (1 Point)                  [ ] Hormonal therapy  or oral contraception          (1 Point)                 [ ] Sepsis (in the previous month)                        (1 Point)                  [ ] History of pregnancy complications                 (1 point)  [ ] Pneumonia or serious lung disease                                               [ ] Unexplained or recurrent                     (1 Point)           (in the previous month)                               (1 Point)  [ ] Abnormal pulmonary function test                     (1 Point)                 SURGERY RELATED RISK FACTORS  [ ] Acute myocardial infarction                              (1 Point)                 [ ]  Section                                             (1 Point)  [ ] Congestive heart failure (in the previous month)  (1 Point)               [ ] Minor surgery                                                  (1 Point)   [ ] Inflammatory bowel disease                             (1 Point)                 [ ] Arthroscopic surgery                                        (2 Points)  [ ] Central venous access                                      (2 Points)                [ x] General surgery lasting more than 45 minutes   (2 Points)       [ ] Stroke (in the previous month)                          (5 Points)               [ ] Elective arthroplasty                                         (5 Points)            (x ) presents and past malignancy                           (2 points)                                                                                                         HEMATOLOGY RELATED FACTORS                                                 TRAUMA RELATED RISK FACTORS  [ ] Prior episodes of VTE                                     (3 Points)                 [ ] Fracture of the hip, pelvis, or leg                       (5 Points)  [ ] Positive family history for VTE                         (3 Points)                 [ ] Acute spinal cord injury (in the previous month)  (5 Points)  [ ] Prothrombin 14509 A                                     (3 Points)                 [ ] Paralysis  (less than 1 month)                             (5 Points)  [ ] Factor V Leiden                                             (3 Points)                  [ ] Multiple Trauma within 1 month                        (5 Points)  [ ] Lupus anticoagulants                                     (3 Points)                                                           [ ] Anticardiolipin antibodies                               (3 Points)                                                       [ ] High homocysteine in the blood                      (3 Points)                                             [ ] Other congenital or acquired thrombophilia      (3 Points)                                                [ ] Heparin induced thrombocytopenia                  (3 Points)                                          Total Score [     8    ]      The Caprini score indicates that this patient is at high risk for a VTE event (score 6 or greater). Surgical patients in this group will benefit from both pharmacologic prophylaxis and intermittent compression devices.  The surgical team will determine the balance between VTE risk and bleeding risk, and other clinical considerations

## 2022-11-23 NOTE — H&P PST ADULT - NSICDXPASTSURGICALHX_GEN_ALL_CORE_FT
PAST SURGICAL HISTORY:  History of cataract surgery     History of open reduction and internal fixation (ORIF) procedure right femur 2018

## 2022-11-24 DIAGNOSIS — C22.0 LIVER CELL CARCINOMA: ICD-10-CM

## 2022-11-24 DIAGNOSIS — Z01.818 ENCOUNTER FOR OTHER PREPROCEDURAL EXAMINATION: ICD-10-CM

## 2022-12-01 ENCOUNTER — OUTPATIENT (OUTPATIENT)
Dept: OUTPATIENT SERVICES | Facility: HOSPITAL | Age: 82
LOS: 1 days | End: 2022-12-01
Payer: MEDICARE

## 2022-12-01 ENCOUNTER — RESULT REVIEW (OUTPATIENT)
Age: 82
End: 2022-12-01

## 2022-12-01 DIAGNOSIS — Z98.49 CATARACT EXTRACTION STATUS, UNSPECIFIED EYE: Chronic | ICD-10-CM

## 2022-12-01 DIAGNOSIS — Z98.890 OTHER SPECIFIED POSTPROCEDURAL STATES: Chronic | ICD-10-CM

## 2022-12-01 DIAGNOSIS — C22.0 LIVER CELL CARCINOMA: ICD-10-CM

## 2022-12-01 PROCEDURE — 88321 CONSLTJ&REPRT SLD PREP ELSWR: CPT

## 2022-12-02 ENCOUNTER — APPOINTMENT (OUTPATIENT)
Dept: UROGYNECOLOGY | Facility: CLINIC | Age: 82
End: 2022-12-02

## 2022-12-02 DIAGNOSIS — C22.0 LIVER CELL CARCINOMA: ICD-10-CM

## 2022-12-02 LAB — SURGICAL PATHOLOGY STUDY: SIGNIFICANT CHANGE UP

## 2022-12-06 ENCOUNTER — INPATIENT (INPATIENT)
Facility: HOSPITAL | Age: 82
LOS: 2 days | Discharge: HOME HEALTH | DRG: 419 | End: 2022-12-09
Attending: SURGERY | Admitting: SURGERY
Payer: MEDICARE

## 2022-12-06 ENCOUNTER — APPOINTMENT (OUTPATIENT)
Dept: SURGICAL ONCOLOGY | Facility: HOSPITAL | Age: 82
End: 2022-12-06

## 2022-12-06 ENCOUNTER — RESULT REVIEW (OUTPATIENT)
Age: 82
End: 2022-12-06

## 2022-12-06 ENCOUNTER — TRANSCRIPTION ENCOUNTER (OUTPATIENT)
Age: 82
End: 2022-12-06

## 2022-12-06 VITALS
TEMPERATURE: 98 F | OXYGEN SATURATION: 98 % | DIASTOLIC BLOOD PRESSURE: 63 MMHG | SYSTOLIC BLOOD PRESSURE: 140 MMHG | HEART RATE: 60 BPM | HEIGHT: 67 IN | RESPIRATION RATE: 16 BRPM | WEIGHT: 169.98 LBS

## 2022-12-06 DIAGNOSIS — Z98.890 OTHER SPECIFIED POSTPROCEDURAL STATES: Chronic | ICD-10-CM

## 2022-12-06 DIAGNOSIS — C22.0 LIVER CELL CARCINOMA: ICD-10-CM

## 2022-12-06 DIAGNOSIS — Z98.49 CATARACT EXTRACTION STATUS, UNSPECIFIED EYE: Chronic | ICD-10-CM

## 2022-12-06 LAB
ANION GAP SERPL CALC-SCNC: 4 MMOL/L — LOW (ref 5–17)
BUN SERPL-MCNC: 18 MG/DL — SIGNIFICANT CHANGE UP (ref 7–23)
CALCIUM SERPL-MCNC: 7.5 MG/DL — LOW (ref 8.5–10.1)
CHLORIDE SERPL-SCNC: 118 MMOL/L — HIGH (ref 96–108)
CO2 SERPL-SCNC: 24 MMOL/L — SIGNIFICANT CHANGE UP (ref 22–31)
CREAT SERPL-MCNC: 0.78 MG/DL — SIGNIFICANT CHANGE UP (ref 0.5–1.3)
EGFR: 76 ML/MIN/1.73M2 — SIGNIFICANT CHANGE UP
GLUCOSE SERPL-MCNC: 184 MG/DL — HIGH (ref 70–99)
HCT VFR BLD CALC: 43.6 % — SIGNIFICANT CHANGE UP (ref 34.5–45)
HGB BLD-MCNC: 14.4 G/DL — SIGNIFICANT CHANGE UP (ref 11.5–15.5)
LACTATE SERPL-SCNC: 1.6 MMOL/L — SIGNIFICANT CHANGE UP (ref 0.7–2)
MAGNESIUM SERPL-MCNC: 1.8 MG/DL — SIGNIFICANT CHANGE UP (ref 1.6–2.6)
MCHC RBC-ENTMCNC: 30.8 PG — SIGNIFICANT CHANGE UP (ref 27–34)
MCHC RBC-ENTMCNC: 33 GM/DL — SIGNIFICANT CHANGE UP (ref 32–36)
MCV RBC AUTO: 93.2 FL — SIGNIFICANT CHANGE UP (ref 80–100)
PHOSPHATE SERPL-MCNC: 3.4 MG/DL — SIGNIFICANT CHANGE UP (ref 2.5–4.5)
PLATELET # BLD AUTO: 159 K/UL — SIGNIFICANT CHANGE UP (ref 150–400)
POTASSIUM SERPL-MCNC: 5 MMOL/L — SIGNIFICANT CHANGE UP (ref 3.5–5.3)
POTASSIUM SERPL-SCNC: 5 MMOL/L — SIGNIFICANT CHANGE UP (ref 3.5–5.3)
RBC # BLD: 4.68 M/UL — SIGNIFICANT CHANGE UP (ref 3.8–5.2)
RBC # FLD: 13.9 % — SIGNIFICANT CHANGE UP (ref 10.3–14.5)
SODIUM SERPL-SCNC: 146 MMOL/L — HIGH (ref 135–145)
WBC # BLD: 12.62 K/UL — HIGH (ref 3.8–10.5)
WBC # FLD AUTO: 12.62 K/UL — HIGH (ref 3.8–10.5)

## 2022-12-06 PROCEDURE — C9399: CPT

## 2022-12-06 PROCEDURE — C1889: CPT

## 2022-12-06 PROCEDURE — 47120L: CUSTOM

## 2022-12-06 PROCEDURE — 88309 TISSUE EXAM BY PATHOLOGIST: CPT | Mod: 26

## 2022-12-06 PROCEDURE — 83605 ASSAY OF LACTIC ACID: CPT

## 2022-12-06 PROCEDURE — 36415 COLL VENOUS BLD VENIPUNCTURE: CPT

## 2022-12-06 PROCEDURE — P9016: CPT

## 2022-12-06 PROCEDURE — 80048 BASIC METABOLIC PNL TOTAL CA: CPT

## 2022-12-06 PROCEDURE — 88309 TISSUE EXAM BY PATHOLOGIST: CPT

## 2022-12-06 PROCEDURE — 85025 COMPLETE CBC W/AUTO DIFF WBC: CPT

## 2022-12-06 PROCEDURE — 88313 SPECIAL STAINS GROUP 2: CPT

## 2022-12-06 PROCEDURE — 97116 GAIT TRAINING THERAPY: CPT | Mod: GP

## 2022-12-06 PROCEDURE — 85027 COMPLETE CBC AUTOMATED: CPT

## 2022-12-06 PROCEDURE — 84100 ASSAY OF PHOSPHORUS: CPT

## 2022-12-06 PROCEDURE — 83735 ASSAY OF MAGNESIUM: CPT

## 2022-12-06 PROCEDURE — 88313 SPECIAL STAINS GROUP 2: CPT | Mod: 26

## 2022-12-06 PROCEDURE — 97163 PT EVAL HIGH COMPLEX 45 MIN: CPT | Mod: GP

## 2022-12-06 PROCEDURE — 36430 TRANSFUSION BLD/BLD COMPNT: CPT

## 2022-12-06 PROCEDURE — C9290: CPT

## 2022-12-06 RX ORDER — ONDANSETRON 8 MG/1
4 TABLET, FILM COATED ORAL ONCE
Refills: 0 | Status: DISCONTINUED | OUTPATIENT
Start: 2022-12-06 | End: 2022-12-06

## 2022-12-06 RX ORDER — NIFEDIPINE 30 MG
1 TABLET, EXTENDED RELEASE 24 HR ORAL
Qty: 0 | Refills: 0 | DISCHARGE

## 2022-12-06 RX ORDER — SODIUM CHLORIDE 9 MG/ML
1000 INJECTION, SOLUTION INTRAVENOUS
Refills: 0 | Status: DISCONTINUED | OUTPATIENT
Start: 2022-12-06 | End: 2022-12-06

## 2022-12-06 RX ORDER — HYDRALAZINE HCL 50 MG
15 TABLET ORAL EVERY 6 HOURS
Refills: 0 | Status: DISCONTINUED | OUTPATIENT
Start: 2022-12-06 | End: 2022-12-09

## 2022-12-06 RX ORDER — SODIUM CHLORIDE 9 MG/ML
1000 INJECTION, SOLUTION INTRAVENOUS
Refills: 0 | Status: DISCONTINUED | OUTPATIENT
Start: 2022-12-06 | End: 2022-12-07

## 2022-12-06 RX ORDER — HYDROCHLOROTHIAZIDE 25 MG
1 TABLET ORAL
Qty: 0 | Refills: 0 | DISCHARGE

## 2022-12-06 RX ORDER — MORPHINE SULFATE 50 MG/1
2 CAPSULE, EXTENDED RELEASE ORAL EVERY 4 HOURS
Refills: 0 | Status: DISCONTINUED | OUTPATIENT
Start: 2022-12-06 | End: 2022-12-08

## 2022-12-06 RX ORDER — OXYCODONE HYDROCHLORIDE 5 MG/1
5 TABLET ORAL ONCE
Refills: 0 | Status: DISCONTINUED | OUTPATIENT
Start: 2022-12-06 | End: 2022-12-06

## 2022-12-06 RX ORDER — ASPIRIN/CALCIUM CARB/MAGNESIUM 324 MG
1 TABLET ORAL
Qty: 0 | Refills: 0 | DISCHARGE

## 2022-12-06 RX ORDER — FENTANYL CITRATE 50 UG/ML
50 INJECTION INTRAVENOUS
Refills: 0 | Status: DISCONTINUED | OUTPATIENT
Start: 2022-12-06 | End: 2022-12-06

## 2022-12-06 RX ORDER — PREGABALIN 225 MG/1
0 CAPSULE ORAL
Qty: 0 | Refills: 0 | DISCHARGE

## 2022-12-06 RX ORDER — ONDANSETRON 8 MG/1
4 TABLET, FILM COATED ORAL EVERY 8 HOURS
Refills: 0 | Status: DISCONTINUED | OUTPATIENT
Start: 2022-12-06 | End: 2022-12-09

## 2022-12-06 RX ORDER — LABETALOL HCL 100 MG
10 TABLET ORAL ONCE
Refills: 0 | Status: COMPLETED | OUTPATIENT
Start: 2022-12-06 | End: 2022-12-06

## 2022-12-06 RX ORDER — DIPHENOXYLATE HCL/ATROPINE 2.5-.025MG
2 TABLET ORAL
Qty: 0 | Refills: 0 | DISCHARGE

## 2022-12-06 RX ORDER — SIMVASTATIN 20 MG/1
1 TABLET, FILM COATED ORAL
Qty: 0 | Refills: 0 | DISCHARGE

## 2022-12-06 RX ORDER — LABETALOL HCL 100 MG
1 TABLET ORAL
Qty: 0 | Refills: 0 | DISCHARGE

## 2022-12-06 RX ORDER — ACETAMINOPHEN 500 MG
1000 TABLET ORAL ONCE
Refills: 0 | Status: DISCONTINUED | OUTPATIENT
Start: 2022-12-06 | End: 2022-12-09

## 2022-12-06 RX ORDER — LOSARTAN POTASSIUM 100 MG/1
1 TABLET, FILM COATED ORAL
Qty: 0 | Refills: 0 | DISCHARGE

## 2022-12-06 RX ORDER — CHOLECALCIFEROL (VITAMIN D3) 125 MCG
0 CAPSULE ORAL
Qty: 0 | Refills: 0 | DISCHARGE

## 2022-12-06 RX ADMIN — Medication 10 MILLIGRAM(S): at 21:02

## 2022-12-06 RX ADMIN — SODIUM CHLORIDE 100 MILLILITER(S): 9 INJECTION, SOLUTION INTRAVENOUS at 18:01

## 2022-12-06 RX ADMIN — FENTANYL CITRATE 50 MICROGRAM(S): 50 INJECTION INTRAVENOUS at 20:07

## 2022-12-06 RX ADMIN — FENTANYL CITRATE 50 MICROGRAM(S): 50 INJECTION INTRAVENOUS at 19:37

## 2022-12-06 NOTE — CONSULT NOTE ADULT - ASSESSMENT
82 year old female with HTN, HLD presents from PACU with recently diagnosed hepatocellular carcinoma this past summer. She reports the cancer was an incidental finding on imaging she had done of her kidneys for her ongoing HTN. She underwent a laparoscopic partial hepatectomy of segments IV and V and laparoscopic cholecystectomy today with 3L blood loss intraoperatively, since repleted with 3U PRBCs. Patient remains HDS in PACU. ICU consulted for SICU admission for continued observation.    HCC s/p laparoscopic partial hepatectomy  s/p laparoscopic cholecystectomy    Plan:  - Hgb remains stable s/p 3U PRBCs. Continue to trend, transfuse for goal Hgb >8  - Breathing comfortably on 3L NC, goal SpO2 >92%  - Continue to monitor YEIMY drain output  - Pain management with morphine, oxycodone, and fentanyl  - Nausea control with zofran  - NPO  - SCDs for mechanical DVT ppx  - Rest of care as per primary team    Dispo: Admit to CICU for further observation overnight.    Case discussed with ICU attending, Dr Unger

## 2022-12-06 NOTE — PATIENT PROFILE ADULT - CENTRAL VENOUS CATHETER/PICC LINE
Patient was advised of today's exam findings.  Optional to fill new glasses prescription, minimal change  Return in 1 year for eye exam    Sindhu Leach O.D.  [facility]   [address] [place] [address] [phone]    
no

## 2022-12-06 NOTE — BRIEF OPERATIVE NOTE - NSICDXBRIEFPROCEDURE_GEN_ALL_CORE_FT
PROCEDURES:  Hepatectomy, partial 06-Dec-2022 17:34:11  Dave Irizarry  Block, transversus abdominis plane, bilateral 06-Dec-2022 17:34:24  Dave Irizarry  Laparoscopic cholecystec 06-Dec-2022 17:34:33  Dave Irizarry

## 2022-12-06 NOTE — CONSULT NOTE ADULT - SUBJECTIVE AND OBJECTIVE BOX
Patient is a 82y old  Female who presents with a chief complaint of HCC    BRIEF HOSPITAL COURSE: 82 year old female with HTN, HLD presents from PACU with recently diagnosed hepatocellular carcinoma this past summer. She reports the cancer was an incidental finding on imaging she had done of her kidneys for her ongoing HTN. She underwent a laparoscopic partial hepatectomy of segments IV and V and laparoscopic cholecystectomy today with 3L blood loss intraoperatively, since repleted with 3U PRBCs. Patient remains HDS in PACU. ICU consulted for SICU admission for continued observation.      PAST MEDICAL & SURGICAL HISTORY:  HTN (hypertension)      HLD (hyperlipidemia)      Hepatocellular carcinoma      Female bladder prolapse      Rectal prolapse      Leaky heart valve      DVT, lower extremity  1960&#x27;s x1 episode only; was taking birth control pills      Thyroid nodule      History of cataract surgery      History of open reduction and internal fixation (ORIF) procedure  right femur 2018          Review of Systems:  CONSTITUTIONAL: No fever, chills, or fatigue  EYES: No eye pain, visual disturbances, or discharge  ENMT:  No difficulty hearing, tinnitus, vertigo; No sinus or throat pain  NECK: No pain or stiffness  RESPIRATORY: No cough, wheezing, chills or hemoptysis; No shortness of breath  CARDIOVASCULAR: No chest pain, palpitations, dizziness, or leg swelling  GASTROINTESTINAL: No abdominal or epigastric pain. No nausea, vomiting, or hematemesis; No diarrhea or constipation. No melena or hematochezia.  GENITOURINARY: No dysuria, frequency, hematuria, or incontinence  NEUROLOGICAL: No headaches, memory loss, loss of strength, numbness, or tremors  SKIN: No itching, burning, rashes, or lesions   MUSCULOSKELETAL: No joint pain or swelling; No muscle, back, or extremity pain  PSYCHIATRIC: No depression, anxiety, mood swings, or difficulty sleeping      Medications:        acetaminophen   IVPB .. 1000 milliGRAM(s) IV Intermittent once PRN  fentaNYL    Injectable 50 MICROGram(s) IV Push every 10 minutes PRN  morphine  - Injectable 2 milliGRAM(s) IV Push every 4 hours PRN  ondansetron Injectable 4 milliGRAM(s) IV Push once PRN  oxyCODONE    IR 5 milliGRAM(s) Oral once PRN              lactated ringers. 1000 milliLiter(s) IV Continuous <Continuous>  lactated ringers. 1000 milliLiter(s) IV Continuous <Continuous>                ICU Vital Signs Last 24 Hrs  T(C): 36.1 (06 Dec 2022 17:05), Max: 36.8 (06 Dec 2022 09:38)  T(F): 97 (06 Dec 2022 17:05), Max: 98.2 (06 Dec 2022 09:38)  HR: 58 (06 Dec 2022 17:45) (58 - 60)  BP: 125/54 (06 Dec 2022 17:45) (110/58 - 140/63)  BP(mean): --  ABP: 145/61 (06 Dec 2022 17:45) (129/55 - 145/61)  ABP(mean): --  RR: 12 (06 Dec 2022 17:45) (12 - 24)  SpO2: 97% (06 Dec 2022 17:45) (95% - 99%)    O2 Parameters below as of 06 Dec 2022 17:45    O2 Flow (L/min): 3              I&O's Detail    06 Dec 2022 07:01  -  06 Dec 2022 18:26  --------------------------------------------------------  IN:    Other (mL): 3000 mL    PRBCs (Packed Red Blood Cells): 997 mL  Total IN: 3997 mL    OUT:    Blood Loss (mL): 2000 mL    Other (mL): 425 mL  Total OUT: 2425 mL    Total NET: 1572 mL            LABS:                        14.4   12.62 )-----------( 159      ( 06 Dec 2022 17:50 )             43.6                 CAPILLARY BLOOD GLUCOSE            CULTURES:      Physical Examination:    General: No acute distress.  Alert, oriented, interactive, nonfocal, following simple commands and moving all extremities     HEENT: Pupils equal, reactive to light.  Symmetric.    PULM: Breathing comfortably, good BS B/L with no Rales or Rhonchi, no significant sputum production    CVS: Regular rate and rhythm, no murmurs, rubs, or gallops    ABD: BS+ Soft, nondistended, mild tenderness diffusely, no masses, YEIMY drain with serosanguinous fluid    EXT: No edema, nontender    SKIN: Warm and well perfused, no rashes noted.

## 2022-12-07 ENCOUNTER — TRANSCRIPTION ENCOUNTER (OUTPATIENT)
Age: 82
End: 2022-12-07

## 2022-12-07 LAB
ANION GAP SERPL CALC-SCNC: 1 MMOL/L — LOW (ref 5–17)
BASOPHILS # BLD AUTO: 0.03 K/UL — SIGNIFICANT CHANGE UP (ref 0–0.2)
BASOPHILS NFR BLD AUTO: 0.2 % — SIGNIFICANT CHANGE UP (ref 0–2)
BUN SERPL-MCNC: 18 MG/DL — SIGNIFICANT CHANGE UP (ref 7–23)
CALCIUM SERPL-MCNC: 7.8 MG/DL — LOW (ref 8.5–10.1)
CHLORIDE SERPL-SCNC: 114 MMOL/L — HIGH (ref 96–108)
CO2 SERPL-SCNC: 28 MMOL/L — SIGNIFICANT CHANGE UP (ref 22–31)
CREAT SERPL-MCNC: 0.75 MG/DL — SIGNIFICANT CHANGE UP (ref 0.5–1.3)
EGFR: 80 ML/MIN/1.73M2 — SIGNIFICANT CHANGE UP
EOSINOPHIL # BLD AUTO: 0 K/UL — SIGNIFICANT CHANGE UP (ref 0–0.5)
EOSINOPHIL NFR BLD AUTO: 0 % — SIGNIFICANT CHANGE UP (ref 0–6)
GLUCOSE SERPL-MCNC: 152 MG/DL — HIGH (ref 70–99)
HCT VFR BLD CALC: 42.9 % — SIGNIFICANT CHANGE UP (ref 34.5–45)
HGB BLD-MCNC: 14.5 G/DL — SIGNIFICANT CHANGE UP (ref 11.5–15.5)
IMM GRANULOCYTES NFR BLD AUTO: 0.5 % — SIGNIFICANT CHANGE UP (ref 0–0.9)
LYMPHOCYTES # BLD AUTO: 0.71 K/UL — LOW (ref 1–3.3)
LYMPHOCYTES # BLD AUTO: 5.5 % — LOW (ref 13–44)
MAGNESIUM SERPL-MCNC: 1.7 MG/DL — SIGNIFICANT CHANGE UP (ref 1.6–2.6)
MCHC RBC-ENTMCNC: 31.3 PG — SIGNIFICANT CHANGE UP (ref 27–34)
MCHC RBC-ENTMCNC: 33.8 GM/DL — SIGNIFICANT CHANGE UP (ref 32–36)
MCV RBC AUTO: 92.5 FL — SIGNIFICANT CHANGE UP (ref 80–100)
MONOCYTES # BLD AUTO: 0.74 K/UL — SIGNIFICANT CHANGE UP (ref 0–0.9)
MONOCYTES NFR BLD AUTO: 5.8 % — SIGNIFICANT CHANGE UP (ref 2–14)
NEUTROPHILS # BLD AUTO: 11.27 K/UL — HIGH (ref 1.8–7.4)
NEUTROPHILS NFR BLD AUTO: 88 % — HIGH (ref 43–77)
PHOSPHATE SERPL-MCNC: 3.4 MG/DL — SIGNIFICANT CHANGE UP (ref 2.5–4.5)
PLATELET # BLD AUTO: 146 K/UL — LOW (ref 150–400)
POTASSIUM SERPL-MCNC: 4.7 MMOL/L — SIGNIFICANT CHANGE UP (ref 3.5–5.3)
POTASSIUM SERPL-SCNC: 4.7 MMOL/L — SIGNIFICANT CHANGE UP (ref 3.5–5.3)
RBC # BLD: 4.64 M/UL — SIGNIFICANT CHANGE UP (ref 3.8–5.2)
RBC # FLD: 14.2 % — SIGNIFICANT CHANGE UP (ref 10.3–14.5)
SODIUM SERPL-SCNC: 143 MMOL/L — SIGNIFICANT CHANGE UP (ref 135–145)
WBC # BLD: 12.82 K/UL — HIGH (ref 3.8–10.5)
WBC # FLD AUTO: 12.82 K/UL — HIGH (ref 3.8–10.5)

## 2022-12-07 RX ORDER — ACETAMINOPHEN 500 MG
1000 TABLET ORAL ONCE
Refills: 0 | Status: COMPLETED | OUTPATIENT
Start: 2022-12-07 | End: 2022-12-07

## 2022-12-07 RX ADMIN — MORPHINE SULFATE 2 MILLIGRAM(S): 50 CAPSULE, EXTENDED RELEASE ORAL at 15:43

## 2022-12-07 RX ADMIN — Medication 400 MILLIGRAM(S): at 10:43

## 2022-12-07 RX ADMIN — Medication 1000 MILLIGRAM(S): at 11:10

## 2022-12-07 RX ADMIN — Medication 15 MILLIGRAM(S): at 10:43

## 2022-12-07 RX ADMIN — MORPHINE SULFATE 2 MILLIGRAM(S): 50 CAPSULE, EXTENDED RELEASE ORAL at 16:19

## 2022-12-07 NOTE — PROGRESS NOTE ADULT - SUBJECTIVE AND OBJECTIVE BOX
Patient was seen and examined at the bedside this morning  No acute events overnight  Pain is better controlled  No nausea or vomiting      O/E:  T(C): 37.4 (12-07-22 @ 09:21), Max: 37.4 (12-07-22 @ 09:21)  HR: 82 (12-07-22 @ 12:00) (58 - 82)  BP: 131/57 (12-07-22 @ 12:00) (110/58 - 161/58)  RR: 19 (12-07-22 @ 12:00) (12 - 24)  SpO2: 95% (12-07-22 @ 12:00) (93% - 99%)  Alert, conscious, oriented  No pallor, jaundice or cyanosis  Not in pain or distress  Chest clear, equal air entry bilaterally  Abdomen soft and lax, no tenderness, incisions clean and dry. YEIMY in place with serosanguinous output  Extremities: No swelling or tenderness    12-06-22 @ 07:01  -  12-07-22 @ 07:00  --------------------------------------------------------  IN: 4597 mL / OUT: 3370 mL / NET: 1227 mL                            14.5   12.82 )-----------( 146      ( 07 Dec 2022 05:27 )             42.9   12-07    143  |  114<H>  |  18  ----------------------------<  152<H>  4.7   |  28  |  0.75    Ca    7.8<L>      07 Dec 2022 05:27  Phos  3.4     12-07  Mg     1.7     12-07    MEDICATIONS  (STANDING):  lactated ringers. 1000 milliLiter(s) (100 mL/Hr) IV Continuous <Continuous>    MEDICATIONS  (PRN):  acetaminophen   IVPB .. 1000 milliGRAM(s) IV Intermittent once PRN Mild Pain (1 - 3)  hydrALAZINE Injectable 15 milliGRAM(s) IV Push every 6 hours PRN sbp >150  morphine  - Injectable 2 milliGRAM(s) IV Push every 4 hours PRN Severe Pain (7 - 10)  ondansetron Injectable 4 milliGRAM(s) IV Push every 8 hours PRN Nausea and/or Vomiting

## 2022-12-07 NOTE — DISCHARGE NOTE NURSING/CASE MANAGEMENT/SOCIAL WORK - NSDCPEFALRISK_GEN_ALL_CORE
For information on Fall & Injury Prevention, visit: [email or web address] OR  [email or web address] OR  https://www.cdc.gov/steadi/patient.html

## 2022-12-07 NOTE — DISCHARGE NOTE NURSING/CASE MANAGEMENT/SOCIAL WORK - PATIENT PORTAL LINK FT
You can access the FollowMyHealth Patient Portal offered by [facility] by registering at the following website: [email or web address]. By joining [facility]’s FollowMyHealth portal, you will also be able to view your health information using other applications (apps) compatible with our system.

## 2022-12-07 NOTE — PROVIDER CONTACT NOTE (OTHER) - SITUATION
Office is aware that patient is in HHSD.  Please fax discharge papers ro [phone].
farley cathetre is removed at 8.30 in the morning and patient voided 200ml. post void bladder scan showed 339ml.

## 2022-12-08 ENCOUNTER — TRANSCRIPTION ENCOUNTER (OUTPATIENT)
Age: 82
End: 2022-12-08

## 2022-12-08 LAB
ANION GAP SERPL CALC-SCNC: 4 MMOL/L — LOW (ref 5–17)
BASOPHILS # BLD AUTO: 0.04 K/UL — SIGNIFICANT CHANGE UP (ref 0–0.2)
BASOPHILS NFR BLD AUTO: 0.3 % — SIGNIFICANT CHANGE UP (ref 0–2)
BUN SERPL-MCNC: 11 MG/DL — SIGNIFICANT CHANGE UP (ref 7–23)
CALCIUM SERPL-MCNC: 8.2 MG/DL — LOW (ref 8.5–10.1)
CHLORIDE SERPL-SCNC: 110 MMOL/L — HIGH (ref 96–108)
CO2 SERPL-SCNC: 27 MMOL/L — SIGNIFICANT CHANGE UP (ref 22–31)
CREAT SERPL-MCNC: 0.55 MG/DL — SIGNIFICANT CHANGE UP (ref 0.5–1.3)
EGFR: 92 ML/MIN/1.73M2 — SIGNIFICANT CHANGE UP
EOSINOPHIL # BLD AUTO: 0.01 K/UL — SIGNIFICANT CHANGE UP (ref 0–0.5)
EOSINOPHIL NFR BLD AUTO: 0.1 % — SIGNIFICANT CHANGE UP (ref 0–6)
GLUCOSE SERPL-MCNC: 132 MG/DL — HIGH (ref 70–99)
HCT VFR BLD CALC: 40.6 % — SIGNIFICANT CHANGE UP (ref 34.5–45)
HGB BLD-MCNC: 13.5 G/DL — SIGNIFICANT CHANGE UP (ref 11.5–15.5)
IMM GRANULOCYTES NFR BLD AUTO: 0.7 % — SIGNIFICANT CHANGE UP (ref 0–0.9)
LYMPHOCYTES # BLD AUTO: 1.09 K/UL — SIGNIFICANT CHANGE UP (ref 1–3.3)
LYMPHOCYTES # BLD AUTO: 8.1 % — LOW (ref 13–44)
MAGNESIUM SERPL-MCNC: 1.9 MG/DL — SIGNIFICANT CHANGE UP (ref 1.6–2.6)
MCHC RBC-ENTMCNC: 31 PG — SIGNIFICANT CHANGE UP (ref 27–34)
MCHC RBC-ENTMCNC: 33.3 GM/DL — SIGNIFICANT CHANGE UP (ref 32–36)
MCV RBC AUTO: 93.1 FL — SIGNIFICANT CHANGE UP (ref 80–100)
MONOCYTES # BLD AUTO: 0.89 K/UL — SIGNIFICANT CHANGE UP (ref 0–0.9)
MONOCYTES NFR BLD AUTO: 6.7 % — SIGNIFICANT CHANGE UP (ref 2–14)
NEUTROPHILS # BLD AUTO: 11.26 K/UL — HIGH (ref 1.8–7.4)
NEUTROPHILS NFR BLD AUTO: 84.1 % — HIGH (ref 43–77)
PHOSPHATE SERPL-MCNC: 1.8 MG/DL — LOW (ref 2.5–4.5)
PLATELET # BLD AUTO: 141 K/UL — LOW (ref 150–400)
POTASSIUM SERPL-MCNC: 3.9 MMOL/L — SIGNIFICANT CHANGE UP (ref 3.5–5.3)
POTASSIUM SERPL-SCNC: 3.9 MMOL/L — SIGNIFICANT CHANGE UP (ref 3.5–5.3)
RBC # BLD: 4.36 M/UL — SIGNIFICANT CHANGE UP (ref 3.8–5.2)
RBC # FLD: 14.3 % — SIGNIFICANT CHANGE UP (ref 10.3–14.5)
SODIUM SERPL-SCNC: 141 MMOL/L — SIGNIFICANT CHANGE UP (ref 135–145)
WBC # BLD: 13.38 K/UL — HIGH (ref 3.8–10.5)
WBC # FLD AUTO: 13.38 K/UL — HIGH (ref 3.8–10.5)

## 2022-12-08 RX ORDER — HEPARIN SODIUM 5000 [USP'U]/ML
5000 INJECTION INTRAVENOUS; SUBCUTANEOUS EVERY 8 HOURS
Refills: 0 | Status: DISCONTINUED | OUTPATIENT
Start: 2022-12-08 | End: 2022-12-09

## 2022-12-08 RX ORDER — FUROSEMIDE 40 MG
20 TABLET ORAL ONCE
Refills: 0 | Status: COMPLETED | OUTPATIENT
Start: 2022-12-08 | End: 2022-12-08

## 2022-12-08 RX ORDER — OXYCODONE HYDROCHLORIDE 5 MG/1
5 TABLET ORAL EVERY 6 HOURS
Refills: 0 | Status: DISCONTINUED | OUTPATIENT
Start: 2022-12-08 | End: 2022-12-09

## 2022-12-08 RX ORDER — LABETALOL HCL 100 MG
200 TABLET ORAL EVERY 12 HOURS
Refills: 0 | Status: DISCONTINUED | OUTPATIENT
Start: 2022-12-08 | End: 2022-12-09

## 2022-12-08 RX ORDER — LOSARTAN POTASSIUM 100 MG/1
100 TABLET, FILM COATED ORAL DAILY
Refills: 0 | Status: DISCONTINUED | OUTPATIENT
Start: 2022-12-08 | End: 2022-12-09

## 2022-12-08 RX ORDER — ACETAMINOPHEN 500 MG
1000 TABLET ORAL ONCE
Refills: 0 | Status: COMPLETED | OUTPATIENT
Start: 2022-12-08 | End: 2022-12-08

## 2022-12-08 RX ORDER — NIFEDIPINE 30 MG
30 TABLET, EXTENDED RELEASE 24 HR ORAL DAILY
Refills: 0 | Status: DISCONTINUED | OUTPATIENT
Start: 2022-12-08 | End: 2022-12-09

## 2022-12-08 RX ORDER — SIMVASTATIN 20 MG/1
20 TABLET, FILM COATED ORAL AT BEDTIME
Refills: 0 | Status: DISCONTINUED | OUTPATIENT
Start: 2022-12-08 | End: 2022-12-09

## 2022-12-08 RX ADMIN — OXYCODONE HYDROCHLORIDE 5 MILLIGRAM(S): 5 TABLET ORAL at 12:00

## 2022-12-08 RX ADMIN — Medication 20 MILLIGRAM(S): at 11:11

## 2022-12-08 RX ADMIN — HEPARIN SODIUM 5000 UNIT(S): 5000 INJECTION INTRAVENOUS; SUBCUTANEOUS at 13:44

## 2022-12-08 RX ADMIN — Medication 15 MILLIGRAM(S): at 00:33

## 2022-12-08 RX ADMIN — LOSARTAN POTASSIUM 100 MILLIGRAM(S): 100 TABLET, FILM COATED ORAL at 18:29

## 2022-12-08 RX ADMIN — MORPHINE SULFATE 2 MILLIGRAM(S): 50 CAPSULE, EXTENDED RELEASE ORAL at 01:25

## 2022-12-08 RX ADMIN — SIMVASTATIN 20 MILLIGRAM(S): 20 TABLET, FILM COATED ORAL at 21:50

## 2022-12-08 RX ADMIN — Medication 400 MILLIGRAM(S): at 18:28

## 2022-12-08 RX ADMIN — Medication 200 MILLIGRAM(S): at 21:47

## 2022-12-08 RX ADMIN — OXYCODONE HYDROCHLORIDE 5 MILLIGRAM(S): 5 TABLET ORAL at 11:18

## 2022-12-08 RX ADMIN — Medication 1000 MILLIGRAM(S): at 09:30

## 2022-12-08 RX ADMIN — HEPARIN SODIUM 5000 UNIT(S): 5000 INJECTION INTRAVENOUS; SUBCUTANEOUS at 21:51

## 2022-12-08 RX ADMIN — Medication 400 MILLIGRAM(S): at 08:56

## 2022-12-08 RX ADMIN — OXYCODONE HYDROCHLORIDE 5 MILLIGRAM(S): 5 TABLET ORAL at 19:03

## 2022-12-08 RX ADMIN — MORPHINE SULFATE 2 MILLIGRAM(S): 50 CAPSULE, EXTENDED RELEASE ORAL at 01:14

## 2022-12-08 NOTE — DISCHARGE NOTE PROVIDER - NSDCFUSCHEDAPPT_GEN_ALL_CORE_FT
Clif Vasquez  [facility] Physician [facility]  CARDIOLOGY [address]  Scheduled Appointment: 02/09/2023

## 2022-12-08 NOTE — DISCHARGE NOTE PROVIDER - HOSPITAL COURSE
Patient underwent a liver resection for hepatocellular carcinoma. Patient is doing well postoperatively, pain is controlled, tolerating diet. Patient is safe for discharge home.

## 2022-12-08 NOTE — PROGRESS NOTE ADULT - SUBJECTIVE AND OBJECTIVE BOX
Patient was seen and examined at the bedside this morning  No acute events overnight  Pain is better controlled  No nausea or vomiting, having bowel function    Vital Signs Last 24 Hrs  T(C): 36.9 (08 Dec 2022 05:45), Max: 37.4 (07 Dec 2022 09:21)  T(F): 98.5 (08 Dec 2022 05:45), Max: 99.3 (07 Dec 2022 09:21)  HR: 81 (08 Dec 2022 03:00) (76 - 104)  BP: 128/58 (08 Dec 2022 03:00) (128/58 - 161/58)  BP(mean): 74 (08 Dec 2022 03:00) (73 - 88)  RR: 19 (08 Dec 2022 03:00) (19 - 27)  SpO2: 92% (08 Dec 2022 03:00) (92% - 95%)    Parameters below as of 07 Dec 2022 18:49  Patient On (Oxygen Delivery Method): nasal cannula  O2 Flow (L/min): 2                          13.5   13.38 )-----------( 141      ( 08 Dec 2022 05:54 )             40.6     12-08    141  |  110<H>  |  11  ----------------------------<  132<H>  3.9   |  27  |  0.55    Ca    8.2<L>      08 Dec 2022 05:54  Phos  1.8     12-08  Mg     1.9     12-08      I&O's Summary    07 Dec 2022 07:01  -  08 Dec 2022 07:00  --------------------------------------------------------  IN: 0 mL / OUT: 1460 mL / NET: -1460 mL          Alert, conscious, oriented  No pallor, jaundice or cyanosis  Not in pain or distress  Chest clear, equal air entry bilaterally  Abdomen soft and lax, no tenderness, incisions clean and dry. YEIMY in place with dark sanguinous output  Extremities: No swelling or tenderness

## 2022-12-08 NOTE — PHYSICAL THERAPY INITIAL EVALUATION ADULT - MODALITIES TREATMENT COMMENTS
Pt left OOB in chair in NAD with HM, BP Cuff, Pulse Oxym all intact. CBIR. Pt instructed to not get up alone. Chair alarm activated. BASIL Rosales aware

## 2022-12-08 NOTE — PHYSICAL THERAPY INITIAL EVALUATION ADULT - PLANNED THERAPY INTERVENTIONS, PT EVAL
NO Further Physical Therapy Needs at this time as patient is already ambulating at baseline [place]. Will d/c patient and sing off at this time. BASIL haque

## 2022-12-08 NOTE — DISCHARGE NOTE PROVIDER - NSDCMRMEDTOKEN_GEN_ALL_CORE_FT
aspirin 81 mg oral delayed release tablet: 1 tab(s) orally once a day  diphenoxylate-atropine 2.5 mg-0.025 mg oral tablet: 2 tab(s) orally 4 times a day  hydroCHLOROthiazide 25 mg oral tablet: 1 tab(s) orally once a day  labetalol 200 mg oral tablet: 1 tab(s) orally 2 times a day  losartan 100 mg oral tablet: 1 tab(s) orally once a day  NIFEdipine 30 mg oral tablet, extended release: 1 tab(s) orally once a day  simvastatin 20 mg oral tablet: 1 tab(s) orally once a day (at bedtime)  Vitamin B12:   Vitamin D3:

## 2022-12-08 NOTE — PHYSICAL THERAPY INITIAL EVALUATION ADULT - ACTIVE RANGE OF MOTION EXAMINATION, REHAB EVAL
CCHD Screening- pre right hand 99%, post right foot 97% bilateral upper extremity Active ROM was WFL (within functional limits)/bilateral  lower extremity Active ROM was WFL (within functional limits)

## 2022-12-08 NOTE — PHYSICAL THERAPY INITIAL EVALUATION ADULT - GENERAL OBSERVATIONS, REHAB EVAL
Pt rec'd sitting OOB in chair in NAD with HM, BP Cuff, Pulse Oxym, NC (3L/min) all intact. Pt reporting 4/10 Abdominal Pain at rest

## 2022-12-08 NOTE — DISCHARGE NOTE PROVIDER - CARE PROVIDER_API CALL
Frank Rosario (MD)  Surgery  [address], [address] [place] [address]  Phone: [phone]  Fax: [phone]  Follow Up Time: 2 weeks

## 2022-12-09 ENCOUNTER — NON-APPOINTMENT (OUTPATIENT)
Age: 82
End: 2022-12-09

## 2022-12-09 VITALS
HEART RATE: 61 BPM | DIASTOLIC BLOOD PRESSURE: 83 MMHG | TEMPERATURE: 99 F | SYSTOLIC BLOOD PRESSURE: 141 MMHG | OXYGEN SATURATION: 96 % | RESPIRATION RATE: 18 BRPM

## 2022-12-09 RX ADMIN — LOSARTAN POTASSIUM 100 MILLIGRAM(S): 100 TABLET, FILM COATED ORAL at 11:28

## 2022-12-09 RX ADMIN — HEPARIN SODIUM 5000 UNIT(S): 5000 INJECTION INTRAVENOUS; SUBCUTANEOUS at 06:31

## 2022-12-09 RX ADMIN — OXYCODONE HYDROCHLORIDE 5 MILLIGRAM(S): 5 TABLET ORAL at 06:30

## 2022-12-09 NOTE — PROGRESS NOTE ADULT - ASSESSMENT
An 82 year old female with HCC  s/p lap sina and bisegmentectomy (Segment IV and V)    Plan:  Diet as tolerated  Pain medications  Ambulation  Incentive spirometry  Home medications  discharge home today    Plan discussed with Dr Rosario
An 82 year old female with HCC  s/p lap sina and bisegmentectomy (Segment IV and V)    Plan:  Diet as tolerated  Pain medications  Ambulation  Incentive spirometry  Home medications  discharge home today    Plan discussed with Dr Rosario
An 82 year old female with HCC  Day 1 s/p lap sina and bisegmentectomy (Segment IV and V)    Plan:  Diet as tolerated  Pain medications  Ambulation  Incentive spirometry  Home medications  Repeat labs in the morning  Possible discharge in the morning    Plan discussed with Dr Rosario

## 2022-12-09 NOTE — PROGRESS NOTE ADULT - SUBJECTIVE AND OBJECTIVE BOX
Patient was seen and examined at the bedside this morning  No acute events overnight  Pain is better controlled  No nausea or vomiting  Tolerating diet and passing gas, no bowel movements yet    O/E:  T(C): 37 (12-09-22 @ 09:20), Max: 37.5 (12-09-22 @ 00:39)  HR: 76 (12-09-22 @ 09:20) (76 - 106)  BP: 114/48 (12-09-22 @ 09:20) (114/48 - 141/88)  RR: 18 (12-09-22 @ 09:20) (18 - 23)  SpO2: 93% (12-09-22 @ 09:20) (93% - 96%)  Alert, conscious, oriented  No pallor, jaundice or cyanosis  Not in pain or distress  Chest clear, equal air entry bilaterally  Abdomen soft and lax, no tenderness, incisions clean and dry. drain in place with serosanguinous output  Extremities: No swelling or tenderness    12-08-22 @ 07:01  -  12-09-22 @ 07:00  --------------------------------------------------------  IN: 0 mL / OUT: 1350 mL / NET: -1350 mL                            13.5   13.38 )-----------( 141      ( 08 Dec 2022 05:54 )             40.6   12-08    141  |  110<H>  |  11  ----------------------------<  132<H>  3.9   |  27  |  0.55    Ca    8.2<L>      08 Dec 2022 05:54  Phos  1.8     12-08  Mg     1.9     12-08    MEDICATIONS  (STANDING):  heparin   Injectable 5000 Unit(s) SubCutaneous every 8 hours  labetalol 200 milliGRAM(s) Oral every 12 hours  losartan 100 milliGRAM(s) Oral daily  NIFEdipine XL 30 milliGRAM(s) Oral daily  simvastatin 20 milliGRAM(s) Oral at bedtime    MEDICATIONS  (PRN):  acetaminophen   IVPB .. 1000 milliGRAM(s) IV Intermittent once PRN Mild Pain (1 - 3)  hydrALAZINE Injectable 15 milliGRAM(s) IV Push every 6 hours PRN sbp >150  ondansetron Injectable 4 milliGRAM(s) IV Push every 8 hours PRN Nausea and/or Vomiting  oxyCODONE    IR 5 milliGRAM(s) Oral every 6 hours PRN Severe Pain (7 - 10)

## 2022-12-12 LAB — SURGICAL PATHOLOGY STUDY: SIGNIFICANT CHANGE UP

## 2022-12-14 PROBLEM — C22.0 LIVER CELL CARCINOMA: Chronic | Status: ACTIVE | Noted: 2022-11-23

## 2022-12-14 PROBLEM — K62.3 RECTAL PROLAPSE: Chronic | Status: ACTIVE | Noted: 2022-11-23

## 2022-12-14 PROBLEM — I10 ESSENTIAL (PRIMARY) HYPERTENSION: Chronic | Status: ACTIVE | Noted: 2022-11-23

## 2022-12-14 PROBLEM — N81.10 CYSTOCELE, UNSPECIFIED: Chronic | Status: ACTIVE | Noted: 2022-11-23

## 2022-12-14 PROBLEM — E78.5 HYPERLIPIDEMIA, UNSPECIFIED: Chronic | Status: ACTIVE | Noted: 2022-11-23

## 2022-12-14 PROBLEM — I38 ENDOCARDITIS, VALVE UNSPECIFIED: Chronic | Status: ACTIVE | Noted: 2022-11-23

## 2022-12-14 PROBLEM — E04.1 NONTOXIC SINGLE THYROID NODULE: Chronic | Status: ACTIVE | Noted: 2022-11-23

## 2022-12-14 PROBLEM — I82.409 ACUTE EMBOLISM AND THROMBOSIS OF UNSPECIFIED DEEP VEINS OF UNSPECIFIED LOWER EXTREMITY: Chronic | Status: ACTIVE | Noted: 2022-11-23

## 2022-12-14 NOTE — CDI QUERY NOTE - NSCDIOTHERTXTBX_GEN_ALL_CORE_HH
Please include more specific documentation in your progress note and discharge summary.  The documentation in this medical record requires additional clarification to accurately capture the patient’s diagnosis/diagnoses, treatment and or severity of illness. Please document all corresponding diagnoses, either known or suspected, of the clinical indicators described below.    Link Signs and Symptoms to underlying cause if known (e.g. chest pain (likely) secondary to ___________).    Present on Admission:  Was the severity of the condition present on admission? If so, please document in the chart that “(the condition) was present on admission.”    -Postoperative anemia due to blood loss  -Acute Blood Loss Anemia   -Other Please Specify  -Not Clinically Significant  -Acute Blood Loss Anemia was Ruled Out        CLINICAL INDICATORS:    HEMOGLOBIN LEVELS:  Hemoglobin: 13.5 g/dL [11.5 - 15.5] (12-08-22)  Hemoglobin: 14.5 g/dL [11.5 - 15.5] (12-07-22)  Hemoglobin: 14.4 g/dL [11.5 - 15.5] (12-06-22)    Critical care documentation on 12/6/2022:  BRIEF HOSPITAL COURSE: 82 year old female with HTN, HLD presents from PACU with recently diagnosed hepatocellular carcinoma this past summer. She reports the cancer was an incidental finding on imaging she had done of her kidneys for her ongoing HTN. She underwent a laparoscopic partial hepatectomy of segments IV and V and laparoscopic cholecystectomy today with 3L blood loss intraoperatively, since repleted with 3U PRBCs. Patient remains HDS in PACU. ICU consulted for SICU admission for continued observation.    - Hgb remains stable s/p 3U PRBCs. Continue to trend, transfuse for goal Hgb >8  - Breathing comfortably on 3L NC, goal SpO2 >92%  - Continue to monitor YEIMY drain output  - Pain management with morphine, oxycodone, and fentanyl  - Nausea control with zofran  - NPO  - SCDs for mechanical DVT ppx  - Rest of care as per primary team

## 2022-12-19 DIAGNOSIS — C22.0 LIVER CELL CARCINOMA: ICD-10-CM

## 2022-12-19 DIAGNOSIS — E78.5 HYPERLIPIDEMIA, UNSPECIFIED: ICD-10-CM

## 2022-12-19 DIAGNOSIS — I10 ESSENTIAL (PRIMARY) HYPERTENSION: ICD-10-CM

## 2022-12-19 NOTE — CHART NOTE - NSCHARTNOTEFT_GEN_A_CORE
Patient s/p lap sina and bisegmentectomy (Segment IV and V) on 12/6/22. She is POD#2. Patient received Lasix 20mg IVPx1. Received a call from SICU RN to report. Patient only voided 100cc. Bladder scan performed RN reports it showed  >300ml. Post bladder scan she voided another 100cc. Patient does not feel the urge to urinate, denies having suprapubic pressure/pain. Encouraged patient to ambulate. Notify RN if she feels suprapubic pressure/pain or not able to urinate for prolonged peroid of time. Will need strict I/O's. She needs to be monitored for possible urinary retention, will plan to discharge her home tomorrow.
Mrs Echavarria is an 83 y/o woman who presents to the hospital with a biopsy proven hepatocellular carcinoma of the liver involving segments IV and V and abutting the gallbladder. The lesion was found incidentally and she was asymptomatic. She was admitted for surgical resection of this maliganant tumor. Her laparoscopic liver resections was successful and she was discharged after a brief period of observation to ensure no postoperative bleeding.  The final pathology confirms that she had hepatocellular carcinoma and that the margins of resection are free of disease.

## 2022-12-21 ENCOUNTER — APPOINTMENT (OUTPATIENT)
Dept: SURGICAL ONCOLOGY | Facility: CLINIC | Age: 82
End: 2022-12-21

## 2022-12-21 VITALS
BODY MASS INDEX: 26.68 KG/M2 | HEART RATE: 66 BPM | OXYGEN SATURATION: 95 % | DIASTOLIC BLOOD PRESSURE: 80 MMHG | WEIGHT: 170 LBS | SYSTOLIC BLOOD PRESSURE: 161 MMHG | HEIGHT: 67 IN

## 2022-12-21 DIAGNOSIS — C22.0 LIVER CELL CARCINOMA: ICD-10-CM

## 2022-12-21 PROCEDURE — 99024 POSTOP FOLLOW-UP VISIT: CPT

## 2022-12-28 PROBLEM — C22.0 PRIMARY HEPATOCELLULAR CARCINOMA OF LIVER: Status: ACTIVE | Noted: 2022-12-28

## 2022-12-28 NOTE — HISTORY OF PRESENT ILLNESS
[FreeTextEntry1] : Mrs. Echavarria presents to the office was first post op visit\par \par Mrs. Echavarria is a healthy 82-year-old woman who was recently worked up for hypertension including a CT scan to rule out renal artery stenosis. The CT scan which was done in April 25, 2022 demonstrated a hepatic nodule that was contrast enhancing measuring up to 3 cm in greatest diameter adjacent to the gallbladder the lesion was thought to be of uncertain etiology. She had further work up done had US guided liver biopsy which demonstrated a well to moderately differentiated hepatocellular carcinoma. \par \par She underwent  Laparoscopic hepatectomy, segment IV & V. Laparoscopic cholecystectomy on 12/6/22. Pathology demonstrated HCC, foci of lymphovascular invasion present, all margins are free of tumor. \par She reports she has been feeling well. She reports she has good appetite denies having any abdominal pain. No recent fever, chills, n/v/d.  Robotic surgical sties healed well. Pathology results discussed and written copy given to her. \par

## 2022-12-28 NOTE — ASSESSMENT
[FreeTextEntry1] : HEPATOCELLULAR CARCINOMA \par AJCC STAGE II\par \par S/P R/0 RESECTION\par \par She did well postop. I will see her back in one month,

## 2022-12-28 NOTE — REASON FOR VISIT
[Family Member] : family member [de-identified] : Laparoscopic hepatectomy, segment IV & V. Laparoscopic cholecystectomy [de-identified] : 12/6/22

## 2022-12-28 NOTE — PHYSICAL EXAM
[Normal] : oriented to person, place and time, with appropriate affect [de-identified] : surgical sites healed well. No erythema noted

## 2023-02-09 ENCOUNTER — APPOINTMENT (OUTPATIENT)
Dept: CARDIOLOGY | Facility: CLINIC | Age: 83
End: 2023-02-09
Payer: MEDICARE

## 2023-02-09 ENCOUNTER — NON-APPOINTMENT (OUTPATIENT)
Age: 83
End: 2023-02-09

## 2023-02-09 VITALS
HEIGHT: 67 IN | OXYGEN SATURATION: 95 % | HEART RATE: 69 BPM | WEIGHT: 175 LBS | DIASTOLIC BLOOD PRESSURE: 80 MMHG | SYSTOLIC BLOOD PRESSURE: 178 MMHG | BODY MASS INDEX: 27.47 KG/M2

## 2023-02-09 PROCEDURE — 99215 OFFICE O/P EST HI 40 MIN: CPT

## 2023-02-09 PROCEDURE — 93000 ELECTROCARDIOGRAM COMPLETE: CPT

## 2023-02-09 NOTE — CARDIOLOGY SUMMARY
[de-identified] : 2/9/2023, Sinus Bradycardia, low voltages [de-identified] : 5/13/2022, LV EF 60%, mild MR, mild-moderate AI, mild TR with estimated PASP of 25mmHg.\par 11/2/2020, mild MR, mild-moderate AI, mild TR with estimated PASP of 42mmmHg. LVEF 60-65%.  [de-identified] : 10/2/2018, Carotid Duplex: mild non-obstructive ICA disease bilaterally.

## 2023-02-09 NOTE — DISCUSSION/SUMMARY
[FreeTextEntry1] : 1. HTN - controlled at home. Component of white coat HTN.  Recommend continuing Nifedipine ER 30mg daily. Continue losartan 100mg daily and Labetalol 200mg BID (high risk medication with no signs of toxicity). Recommend low salt diet. Patient with resistant HTN. Recommended CTA of the abdomen with contrast to evaluate for BALAJI.  Patient underwent CTA of abdomen and pelvis which was negative for renal artery stenosis.\par \par 2. HLD - on simvastatin 20 mg PO daily.\par \par 3. Mild MR, mild-moderate AI: periodic echo surveillance.\par \par 4. Atherosclerosis of the carotid artery disease: no hx of CVA. Continue simvastatin 20mg daily.\par \par 5. PACs: asymptomatic. On beta blocker therapy. \par \par Follow up in 6 months. [EKG obtained to assist in diagnosis and management of assessed problem(s)] : EKG obtained to assist in diagnosis and management of assessed problem(s)

## 2023-02-09 NOTE — PHYSICAL EXAM
[Normal] : moves all extremities, no focal deficits, normal speech [de-identified] : No carotid bruits auscultated bilaterally

## 2023-02-09 NOTE — HISTORY OF PRESENT ILLNESS
[FreeTextEntry1] : Historical Perspective:\par 83 year old female with PMHx of HTN, HLD, PACs and mild valvular regurgitation presents for routine follow up. Previously she was noted to have mild-moderate AI/MR in the setting of poorly controlled BP. After control of BP, echo was repeated which revealed only mild valvular disease. \par \par Current Health Status:\par Patient with no chest pain, SOB, or palpitations. No hospitalizations since seeing me last. Remains compliant with his medications and reports no adverse effects. BP at home 110-120s systolic. Nervous when going to doctor visits. Patient underwent CTA of abdomen and pelvis which was negative for renal artery stenosis, however mass in around gallbladder found incidentally. Workup revealed hepatocellular carcinoma and it was resected. No chemotherapy needed.\par

## 2023-03-22 ENCOUNTER — OUTPATIENT (OUTPATIENT)
Dept: OUTPATIENT SERVICES | Facility: HOSPITAL | Age: 83
LOS: 1 days | End: 2023-03-22

## 2023-03-22 DIAGNOSIS — Z98.890 OTHER SPECIFIED POSTPROCEDURAL STATES: Chronic | ICD-10-CM

## 2023-03-22 DIAGNOSIS — D64.9 ANEMIA, UNSPECIFIED: ICD-10-CM

## 2023-03-22 DIAGNOSIS — K76.89 OTHER SPECIFIED DISEASES OF LIVER: ICD-10-CM

## 2023-03-22 DIAGNOSIS — Z98.49 CATARACT EXTRACTION STATUS, UNSPECIFIED EYE: Chronic | ICD-10-CM

## 2023-03-27 ENCOUNTER — APPOINTMENT (OUTPATIENT)
Age: 83
End: 2023-03-27
Payer: MEDICARE

## 2023-03-27 VITALS
TEMPERATURE: 97.4 F | DIASTOLIC BLOOD PRESSURE: 78 MMHG | SYSTOLIC BLOOD PRESSURE: 168 MMHG | HEIGHT: 67 IN | OXYGEN SATURATION: 96 % | WEIGHT: 172 LBS | BODY MASS INDEX: 27 KG/M2 | HEART RATE: 64 BPM

## 2023-03-27 PROCEDURE — 99214 OFFICE O/P EST MOD 30 MIN: CPT

## 2023-03-27 NOTE — HISTORY OF PRESENT ILLNESS
[de-identified] : Ms. Echavarria was recently evaluated for renal artery stenosis. A CT scan was concerning for possible gallbladder or hepatic lesion (no BALAJI). MRI then completed at the end of May, 2022 noted a 2.6 x 2.6cm mild to moderately heterogeneously T2 hyperintense subcapsular lesion in segment 5 which bulged the capsule towards the gallbladder fossa.\par \par Also noted were pancreatic cystic foci measuring up to 1.4 cm in the body.  Hepatic lesion felt to be indeterminate- favored to represent an adenoma but evaluation limited by motion artifact.  Neuroendocrine tumor was listed on the differential. \par \par Ariadna reports no personal or family history of malignancy. She is up to date on age appropriate cancer screening. She reports no unintentional weight loss, no hot flashes, no skin changes, no flushing, no diarrhea. She rarely has a glass of wine and is a non-smoker.\par \par CA 19-9, CEA and AFP were all negative at last visit chromogranin A and serotonin levels were also normal.\par \par Pathology from liver biopsy ([facility]) surprisingly reveals well to moderately differentiated hepatocellular carcinoma [de-identified] : s/p partial hepatectomy with Dr. Rosario in Dec 2022. 3.9cm well-differentiated HCC. Negative margins. Focal areas of LVI. \par \par [id number]. Stage II.\par \par She denies any ongoing fevers or chills, no headache, no lumps or bumps, no weight loss, no bleeding or bruising.\par

## 2023-03-27 NOTE — PHYSICAL EXAM
[Fully active, able to carry on all pre-disease performance without restriction] : Status 0 - Fully active, able to carry on all pre-disease performance without restriction [Normal] : affect appropriate [de-identified] : anicteric

## 2023-03-27 NOTE — RESULTS/DATA
[FreeTextEntry1] : Ms. Echavarria is a pleasant 83 year-old woman with an incidental dx of HCC now s/p upfront partial hepatectomy.

## 2023-07-05 ENCOUNTER — RX RENEWAL (OUTPATIENT)
Age: 83
End: 2023-07-05

## 2023-08-18 ENCOUNTER — NON-APPOINTMENT (OUTPATIENT)
Age: 83
End: 2023-08-18

## 2023-08-18 ENCOUNTER — APPOINTMENT (OUTPATIENT)
Dept: CARDIOLOGY | Facility: CLINIC | Age: 83
End: 2023-08-18
Payer: MEDICARE

## 2023-08-18 VITALS
HEART RATE: 76 BPM | SYSTOLIC BLOOD PRESSURE: 142 MMHG | HEIGHT: 67 IN | BODY MASS INDEX: 27.62 KG/M2 | OXYGEN SATURATION: 95 % | DIASTOLIC BLOOD PRESSURE: 76 MMHG | WEIGHT: 176 LBS

## 2023-08-18 DIAGNOSIS — I65.29 OCCLUSION AND STENOSIS OF UNSPECIFIED CAROTID ARTERY: ICD-10-CM

## 2023-08-18 PROCEDURE — 93000 ELECTROCARDIOGRAM COMPLETE: CPT

## 2023-08-18 PROCEDURE — 99215 OFFICE O/P EST HI 40 MIN: CPT

## 2023-08-18 RX ORDER — OXYCODONE 5 MG/1
5 TABLET ORAL
Qty: 28 | Refills: 0 | Status: DISCONTINUED | COMMUNITY
Start: 2022-12-09 | End: 2023-08-18

## 2023-08-18 RX ORDER — SOLIFENACIN SUCCINATE 5 MG/1
5 TABLET ORAL
Qty: 30 | Refills: 0 | Status: DISCONTINUED | COMMUNITY
Start: 2022-08-04 | End: 2023-08-18

## 2023-08-18 NOTE — PHYSICAL EXAM
[Normal] : moves all extremities, no focal deficits, normal speech [de-identified] : No carotid bruits auscultated bilaterally

## 2023-08-18 NOTE — CARDIOLOGY SUMMARY
[de-identified] : 8/18/2023, Sinus Bradycardia, PVC, low voltages 2/9/2023, Sinus Bradycardia, low voltages [de-identified] : 5/13/2022, LV EF 60%, mild MR, mild-moderate AI, mild TR with estimated PASP of 25mmHg.\par  11/2/2020, mild MR, mild-moderate AI, mild TR with estimated PASP of 42mmmHg. LVEF 60-65%.  [de-identified] : 10/2/2018, Carotid Duplex: mild non-obstructive ICA disease bilaterally.

## 2023-09-20 ENCOUNTER — OUTPATIENT (OUTPATIENT)
Dept: OUTPATIENT SERVICES | Facility: HOSPITAL | Age: 83
LOS: 1 days | End: 2023-09-20
Payer: MEDICARE

## 2023-09-20 DIAGNOSIS — Z98.890 OTHER SPECIFIED POSTPROCEDURAL STATES: Chronic | ICD-10-CM

## 2023-09-20 DIAGNOSIS — Z98.49 CATARACT EXTRACTION STATUS, UNSPECIFIED EYE: Chronic | ICD-10-CM

## 2023-09-20 DIAGNOSIS — K76.89 OTHER SPECIFIED DISEASES OF LIVER: ICD-10-CM

## 2023-09-27 ENCOUNTER — RESULT REVIEW (OUTPATIENT)
Age: 83
End: 2023-09-27

## 2023-09-27 ENCOUNTER — APPOINTMENT (OUTPATIENT)
Age: 83
End: 2023-09-27
Payer: MEDICARE

## 2023-09-27 VITALS
TEMPERATURE: 97.1 F | SYSTOLIC BLOOD PRESSURE: 167 MMHG | WEIGHT: 177 LBS | DIASTOLIC BLOOD PRESSURE: 69 MMHG | OXYGEN SATURATION: 93 % | BODY MASS INDEX: 27.78 KG/M2 | HEIGHT: 67 IN | HEART RATE: 57 BPM

## 2023-09-27 LAB
BASOPHILS # BLD AUTO: 0.06 K/UL — SIGNIFICANT CHANGE UP (ref 0–0.2)
BASOPHILS NFR BLD AUTO: 1 % — SIGNIFICANT CHANGE UP (ref 0–2)
EOSINOPHIL # BLD AUTO: 0.24 K/UL — SIGNIFICANT CHANGE UP (ref 0–0.5)
EOSINOPHIL NFR BLD AUTO: 3.9 % — SIGNIFICANT CHANGE UP (ref 0–6)
HCT VFR BLD CALC: 45.9 % — HIGH (ref 34.5–45)
HGB BLD-MCNC: 15.3 G/DL — SIGNIFICANT CHANGE UP (ref 11.5–15.5)
IMM GRANULOCYTES NFR BLD AUTO: 0.3 % — SIGNIFICANT CHANGE UP (ref 0–0.9)
LYMPHOCYTES # BLD AUTO: 1.23 K/UL — SIGNIFICANT CHANGE UP (ref 1–3.3)
LYMPHOCYTES # BLD AUTO: 19.9 % — SIGNIFICANT CHANGE UP (ref 13–44)
MCHC RBC-ENTMCNC: 31.7 PG — SIGNIFICANT CHANGE UP (ref 27–34)
MCHC RBC-ENTMCNC: 33.3 GM/DL — SIGNIFICANT CHANGE UP (ref 32–36)
MCV RBC AUTO: 95 FL — SIGNIFICANT CHANGE UP (ref 80–100)
MONOCYTES # BLD AUTO: 0.6 K/UL — SIGNIFICANT CHANGE UP (ref 0–0.9)
MONOCYTES NFR BLD AUTO: 9.7 % — SIGNIFICANT CHANGE UP (ref 2–14)
NEUTROPHILS # BLD AUTO: 4.04 K/UL — SIGNIFICANT CHANGE UP (ref 1.8–7.4)
NEUTROPHILS NFR BLD AUTO: 65.2 % — SIGNIFICANT CHANGE UP (ref 43–77)
NRBC # BLD: 0 /100 WBCS — SIGNIFICANT CHANGE UP (ref 0–0)
PLATELET # BLD AUTO: 178 K/UL — SIGNIFICANT CHANGE UP (ref 150–400)
RBC # BLD: 4.83 M/UL — SIGNIFICANT CHANGE UP (ref 3.8–5.2)
RBC # FLD: 11.9 % — SIGNIFICANT CHANGE UP (ref 10.3–14.5)
WBC # BLD: 6.19 K/UL — SIGNIFICANT CHANGE UP (ref 3.8–10.5)
WBC # FLD AUTO: 6.19 K/UL — SIGNIFICANT CHANGE UP (ref 3.8–10.5)

## 2023-09-27 PROCEDURE — 85027 COMPLETE CBC AUTOMATED: CPT

## 2023-09-27 PROCEDURE — 99214 OFFICE O/P EST MOD 30 MIN: CPT

## 2023-09-28 LAB
ALBUMIN SERPL ELPH-MCNC: 4.1 G/DL
ALP BLD-CCNC: 78 U/L
ALT SERPL-CCNC: 13 U/L
ANION GAP SERPL CALC-SCNC: 10 MMOL/L
AST SERPL-CCNC: 17 U/L
BILIRUB SERPL-MCNC: 0.5 MG/DL
BUN SERPL-MCNC: 19 MG/DL
CALCIUM SERPL-MCNC: 9.7 MG/DL
CHLORIDE SERPL-SCNC: 103 MMOL/L
CO2 SERPL-SCNC: 30 MMOL/L
CREAT SERPL-MCNC: 0.91 MG/DL
EGFR: 63 ML/MIN/1.73M2
GLUCOSE SERPL-MCNC: 105 MG/DL
POTASSIUM SERPL-SCNC: 3.9 MMOL/L
PROT SERPL-MCNC: 6.6 G/DL
SODIUM SERPL-SCNC: 142 MMOL/L

## 2023-09-29 ENCOUNTER — APPOINTMENT (OUTPATIENT)
Dept: OPHTHALMOLOGY | Facility: CLINIC | Age: 83
End: 2023-09-29
Payer: MEDICARE

## 2023-09-29 ENCOUNTER — NON-APPOINTMENT (OUTPATIENT)
Age: 83
End: 2023-09-29

## 2023-09-29 PROCEDURE — 92014 COMPRE OPH EXAM EST PT 1/>: CPT

## 2023-10-23 RX ORDER — HYDROCHLOROTHIAZIDE 25 MG/1
25 TABLET ORAL DAILY
Qty: 90 | Refills: 3 | Status: ACTIVE | COMMUNITY
Start: 2022-04-22 | End: 1900-01-01

## 2024-01-03 ENCOUNTER — APPOINTMENT (OUTPATIENT)
Dept: CARDIOLOGY | Facility: CLINIC | Age: 84
End: 2024-01-03

## 2024-02-07 ENCOUNTER — APPOINTMENT (OUTPATIENT)
Dept: CARDIOLOGY | Facility: CLINIC | Age: 84
End: 2024-02-07

## 2024-02-23 ENCOUNTER — APPOINTMENT (OUTPATIENT)
Dept: CARDIOLOGY | Facility: CLINIC | Age: 84
End: 2024-02-23
Payer: MEDICARE

## 2024-02-23 ENCOUNTER — NON-APPOINTMENT (OUTPATIENT)
Age: 84
End: 2024-02-23

## 2024-02-23 VITALS
HEART RATE: 68 BPM | SYSTOLIC BLOOD PRESSURE: 172 MMHG | WEIGHT: 175 LBS | HEIGHT: 67 IN | OXYGEN SATURATION: 97 % | DIASTOLIC BLOOD PRESSURE: 90 MMHG | BODY MASS INDEX: 27.47 KG/M2

## 2024-02-23 DIAGNOSIS — Z79.899 OTHER LONG TERM (CURRENT) DRUG THERAPY: ICD-10-CM

## 2024-02-23 DIAGNOSIS — I10 ESSENTIAL (PRIMARY) HYPERTENSION: ICD-10-CM

## 2024-02-23 DIAGNOSIS — I34.0 NONRHEUMATIC MITRAL (VALVE) INSUFFICIENCY: ICD-10-CM

## 2024-02-23 DIAGNOSIS — E78.5 HYPERLIPIDEMIA, UNSPECIFIED: ICD-10-CM

## 2024-02-23 DIAGNOSIS — I35.1 NONRHEUMATIC AORTIC (VALVE) INSUFFICIENCY: ICD-10-CM

## 2024-02-23 PROCEDURE — 93000 ELECTROCARDIOGRAM COMPLETE: CPT

## 2024-02-23 PROCEDURE — 99215 OFFICE O/P EST HI 40 MIN: CPT

## 2024-02-23 PROCEDURE — G2211 COMPLEX E/M VISIT ADD ON: CPT

## 2024-02-23 NOTE — PHYSICAL EXAM
[Normal] : moves all extremities, no focal deficits, normal speech [de-identified] : No carotid bruits auscultated bilaterally

## 2024-02-23 NOTE — CARDIOLOGY SUMMARY
[de-identified] : 2/23/2024, NSR with low voltages 8/18/2023, Sinus Bradycardia, PVC, low voltages 2/9/2023, Sinus Bradycardia, low voltages [de-identified] : 2/7/2024, LV EF 65%, mild-moderate AI, mild MR, mild TR with estimated PASP of 29mmHg. 5/13/2022, LV EF 60%, mild MR, mild-moderate AI, mild TR with estimated PASP of 25mmHg. 11/2/2020, mild MR, mild-moderate AI, mild TR with estimated PASP of 42mmmHg. LVEF 60-65%.  [de-identified] : 10/2/2018, Carotid Duplex: mild non-obstructive ICA disease bilaterally.

## 2024-03-20 ENCOUNTER — OUTPATIENT (OUTPATIENT)
Dept: OUTPATIENT SERVICES | Facility: HOSPITAL | Age: 84
LOS: 1 days | End: 2024-03-20

## 2024-03-20 DIAGNOSIS — K76.89 OTHER SPECIFIED DISEASES OF LIVER: ICD-10-CM

## 2024-03-20 DIAGNOSIS — Z98.49 CATARACT EXTRACTION STATUS, UNSPECIFIED EYE: Chronic | ICD-10-CM

## 2024-03-20 DIAGNOSIS — Z98.890 OTHER SPECIFIED POSTPROCEDURAL STATES: Chronic | ICD-10-CM

## 2024-03-27 ENCOUNTER — LABORATORY RESULT (OUTPATIENT)
Age: 84
End: 2024-03-27

## 2024-03-27 ENCOUNTER — APPOINTMENT (OUTPATIENT)
Age: 84
End: 2024-03-27
Payer: MEDICARE

## 2024-03-27 VITALS
HEIGHT: 67 IN | WEIGHT: 173.6 LBS | TEMPERATURE: 98 F | DIASTOLIC BLOOD PRESSURE: 74 MMHG | BODY MASS INDEX: 27.25 KG/M2 | HEART RATE: 62 BPM | SYSTOLIC BLOOD PRESSURE: 129 MMHG | OXYGEN SATURATION: 94 %

## 2024-03-27 DIAGNOSIS — C22.0 LIVER CELL CARCINOMA: ICD-10-CM

## 2024-03-27 LAB
HCT VFR BLD CALC: 45.9 %
HGB BLD-MCNC: 15.3 G/DL
MCHC RBC-ENTMCNC: 31.7 PG
MCHC RBC-ENTMCNC: 33.3 GM/DL
MCV RBC AUTO: 95.2 FL
PLATELET # BLD AUTO: 173 K/UL
RBC # BLD: 4.82 M/UL
RBC # FLD: 12.3 %
WBC # FLD AUTO: 5.59 K/UL

## 2024-03-27 PROCEDURE — G2211 COMPLEX E/M VISIT ADD ON: CPT

## 2024-03-27 PROCEDURE — 85027 COMPLETE CBC AUTOMATED: CPT

## 2024-03-27 PROCEDURE — 99214 OFFICE O/P EST MOD 30 MIN: CPT

## 2024-03-27 NOTE — PHYSICAL EXAM
[Fully active, able to carry on all pre-disease performance without restriction] : Status 0 - Fully active, able to carry on all pre-disease performance without restriction [Normal] : affect appropriate [de-identified] : anicteric

## 2024-03-27 NOTE — REVIEW OF SYSTEMS
[Fever] : no fever [Chills] : no chills [Fatigue] : no fatigue [Dysphagia] : no dysphagia [Recent Change In Weight] : ~T no recent weight change [Nosebleeds] : no nosebleeds [Loss of Hearing] : no loss of hearing [Odynophagia] : no odynophagia [Chest Pain] : no chest pain [Lower Ext Edema] : no lower extremity edema [Palpitations] : no palpitations [Shortness Of Breath] : no shortness of breath [Cough] : no cough [Wheezing] : no wheezing [Constipation] : no constipation [Abdominal Pain] : no abdominal pain [SOB on Exertion] : no shortness of breath during exertion [Dysmenorrhea/Abn Vaginal Bleeding] : no dysmenorrhea/abnormal vaginal bleeding [Joint Stiffness] : no joint stiffness [Joint Pain] : no joint pain [Skin Wound] : no skin wound [Skin Rash] : no skin rash [Anxiety] : no anxiety [Fainting] : no fainting [Depression] : no depression [Easy Bleeding] : no tendency for easy bleeding [Hot Flashes] : no hot flashes [Swollen Glands] : no swollen glands [Easy Bruising] : no tendency for easy bruising

## 2024-03-27 NOTE — END OF VISIT
[FreeTextEntry3] :  All medical record entries made by the Scribe were at my, Dr. Cornelius Barrera, direction and personally dictated by me on 03/27/2024. I have reviewed the chart and agree that the record accurately reflects my personal performance of the history, physical exam, assessment and plan. I have also personally directed, reviewed, and agreed with the chart. [Time Spent: ___ minutes] : I have spent [unfilled] minutes of time on the encounter. no

## 2024-03-27 NOTE — HISTORY OF PRESENT ILLNESS
[de-identified] : Ms. Echavarria was recently evaluated for renal artery stenosis. A CT scan was concerning for possible gallbladder or hepatic lesion (no BALAJI). MRI then completed at the end of May, 2022 noted a 2.6 x 2.6cm mild to moderately heterogeneously T2 hyperintense subcapsular lesion in segment 5 which bulged the capsule towards the gallbladder fossa.  Also noted were pancreatic cystic foci measuring up to 1.4 cm in the body.  Hepatic lesion felt to be indeterminate- favored to represent an adenoma but evaluation limited by motion artifact.  Neuroendocrine tumor was listed on the differential.   Ariadna reports no personal or family history of malignancy. She is up to date on age appropriate cancer screening. She reports no unintentional weight loss, no hot flashes, no skin changes, no flushing, no diarrhea. She rarely has a glass of wine and is a non-smoker.  CA 19-9, CEA and AFP were all negative , chromogranin A and serotonin levels were also normal.  Pathology from liver biopsy ([facility]) surprisingly reveals well to moderately differentiated hepatocellular carcinoma.  s/p partial hepatectomy with Dr. Rosario in Dec 2022. 3.9cm well-differentiated HCC. Negative margins. Focal areas of LVI.   [id number]. Stage II. [de-identified] : She denies any ongoing fevers or chills, no headache, no lumps or bumps, no weight loss, no bleeding or bruising.  Minimal swelling on LE, and uses compression socks.  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

## 2024-03-27 NOTE — ADDENDUM
[FreeTextEntry1] : I, Lorri Castro, documented this note as a scribe on behalf of Dr. Cornelius Barrera on 03/27/2024.

## 2024-03-27 NOTE — RESULTS/DATA
[FreeTextEntry1] : Ms. Echavarria is a pleasant 84 year-old woman with an incidental dx of HCC now s/p upfront partial hepatectomy.

## 2024-03-28 LAB
AFP-TM SERPL-MCNC: 1.8 NG/ML
ALBUMIN SERPL ELPH-MCNC: 4.1 G/DL
ALP BLD-CCNC: 67 U/L
ALT SERPL-CCNC: 17 U/L
ANION GAP SERPL CALC-SCNC: 14 MMOL/L
AST SERPL-CCNC: 21 U/L
BILIRUB SERPL-MCNC: 0.8 MG/DL
BUN SERPL-MCNC: 20 MG/DL
CALCIUM SERPL-MCNC: 9.9 MG/DL
CHLORIDE SERPL-SCNC: 102 MMOL/L
CO2 SERPL-SCNC: 26 MMOL/L
CREAT SERPL-MCNC: 0.87 MG/DL
EGFR: 66 ML/MIN/1.73M2
GLUCOSE SERPL-MCNC: 134 MG/DL
POTASSIUM SERPL-SCNC: 4.1 MMOL/L
PROT SERPL-MCNC: 6.5 G/DL
SODIUM SERPL-SCNC: 142 MMOL/L

## 2024-04-15 ENCOUNTER — RX RENEWAL (OUTPATIENT)
Age: 84
End: 2024-04-15

## 2024-04-16 RX ORDER — NIFEDIPINE 30 MG/1
30 TABLET, EXTENDED RELEASE ORAL
Qty: 90 | Refills: 3 | Status: ACTIVE | COMMUNITY
Start: 2022-04-22 | End: 1900-01-01

## 2024-04-19 ENCOUNTER — APPOINTMENT (OUTPATIENT)
Dept: UROGYNECOLOGY | Facility: CLINIC | Age: 84
End: 2024-04-19
Payer: MEDICARE

## 2024-04-19 DIAGNOSIS — N39.3 STRESS INCONTINENCE (FEMALE) (MALE): ICD-10-CM

## 2024-04-19 DIAGNOSIS — N32.81 OVERACTIVE BLADDER: ICD-10-CM

## 2024-04-19 DIAGNOSIS — R35.1 NOCTURIA: ICD-10-CM

## 2024-04-19 DIAGNOSIS — R15.9 FULL INCONTINENCE OF FECES: ICD-10-CM

## 2024-04-19 PROCEDURE — 99214 OFFICE O/P EST MOD 30 MIN: CPT

## 2024-08-29 ENCOUNTER — APPOINTMENT (OUTPATIENT)
Dept: UROGYNECOLOGY | Facility: HOSPITAL | Age: 84
End: 2024-08-29

## 2024-09-06 ENCOUNTER — APPOINTMENT (OUTPATIENT)
Dept: UROGYNECOLOGY | Facility: CLINIC | Age: 84
End: 2024-09-06

## 2024-09-18 ENCOUNTER — OUTPATIENT (OUTPATIENT)
Dept: OUTPATIENT SERVICES | Facility: HOSPITAL | Age: 84
LOS: 1 days | End: 2024-09-18

## 2024-09-18 DIAGNOSIS — K76.89 OTHER SPECIFIED DISEASES OF LIVER: ICD-10-CM

## 2024-09-18 DIAGNOSIS — Z98.49 CATARACT EXTRACTION STATUS, UNSPECIFIED EYE: Chronic | ICD-10-CM

## 2024-09-18 DIAGNOSIS — Z98.890 OTHER SPECIFIED POSTPROCEDURAL STATES: Chronic | ICD-10-CM

## 2024-09-19 ENCOUNTER — NON-APPOINTMENT (OUTPATIENT)
Age: 84
End: 2024-09-19

## 2024-09-19 ENCOUNTER — APPOINTMENT (OUTPATIENT)
Dept: CARDIOLOGY | Facility: CLINIC | Age: 84
End: 2024-09-19
Payer: MEDICARE

## 2024-09-19 VITALS
SYSTOLIC BLOOD PRESSURE: 150 MMHG | OXYGEN SATURATION: 97 % | HEART RATE: 70 BPM | DIASTOLIC BLOOD PRESSURE: 80 MMHG | BODY MASS INDEX: 28.25 KG/M2 | WEIGHT: 180 LBS | HEIGHT: 67 IN

## 2024-09-19 DIAGNOSIS — Z79.899 OTHER LONG TERM (CURRENT) DRUG THERAPY: ICD-10-CM

## 2024-09-19 DIAGNOSIS — I10 ESSENTIAL (PRIMARY) HYPERTENSION: ICD-10-CM

## 2024-09-19 DIAGNOSIS — I34.0 NONRHEUMATIC MITRAL (VALVE) INSUFFICIENCY: ICD-10-CM

## 2024-09-19 DIAGNOSIS — E78.5 HYPERLIPIDEMIA, UNSPECIFIED: ICD-10-CM

## 2024-09-19 DIAGNOSIS — I35.1 NONRHEUMATIC AORTIC (VALVE) INSUFFICIENCY: ICD-10-CM

## 2024-09-19 DIAGNOSIS — I49.1 ATRIAL PREMATURE DEPOLARIZATION: ICD-10-CM

## 2024-09-19 PROCEDURE — 93000 ELECTROCARDIOGRAM COMPLETE: CPT

## 2024-09-19 PROCEDURE — 99215 OFFICE O/P EST HI 40 MIN: CPT

## 2024-09-19 PROCEDURE — G2211 COMPLEX E/M VISIT ADD ON: CPT

## 2024-09-19 NOTE — DISCUSSION/SUMMARY
[EKG obtained to assist in diagnosis and management of assessed problem(s)] : EKG obtained to assist in diagnosis and management of assessed problem(s) [FreeTextEntry1] : 1. HTN - controlled at home. Component of white coat HTN.  Recommend continuing Nifedipine ER 30mg daily. Continue losartan 100mg daily and Labetalol 200mg BID (high risk medication with no signs of toxicity). Recommend low salt diet. Patient with resistant HTN. Recommended CTA of the abdomen with contrast to evaluate for BALAJI.  Patient underwent CTA of abdomen and pelvis which was negative for renal artery stenosis.\par  \par  2. HLD - on simvastatin 20 mg PO daily.\par  \par  3. Mild MR, mild-moderate AI: periodic echo surveillance.\par  \par  4. Atherosclerosis of the carotid artery disease: no hx of CVA. Continue simvastatin 20mg daily.\par  \par  5. PACs: asymptomatic. On beta blocker therapy. \par  \par  Follow up in 6 months.

## 2024-09-19 NOTE — PHYSICAL EXAM
[Normal] : moves all extremities, no focal deficits, normal speech [de-identified] : No carotid bruits auscultated bilaterally

## 2024-09-19 NOTE — HISTORY OF PRESENT ILLNESS
[FreeTextEntry1] : Historical Perspective: 84 year old female with PMHx of HTN, HLD, PACs and mild valvular regurgitation presents for routine follow up. Previously she was noted to have mild-moderate AI/MR in the setting of poorly controlled BP. After control of BP, echo was repeated which revealed only mild valvular disease.   Current Health Status: Patient with no chest pain, SOB, or palpitations. No hospitalizations since seeing me last. Remains compliant with his medications and reports no adverse effects. BP at home 110-120s systolic. Nervous when going to doctor visits. Patient underwent CTA of abdomen and pelvis which was negative for renal artery stenosis, however mass in around gallbladder found incidentally. Workup revealed hepatocellular carcinoma and it was resected. No chemotherapy needed.

## 2024-09-19 NOTE — CARDIOLOGY SUMMARY
[de-identified] : 9/19/2024, Sinus Bradycardia, low voltages 2/23/2024, NSR with low voltages 8/18/2023, Sinus Bradycardia, PVC, low voltages 2/9/2023, Sinus Bradycardia, low voltages [de-identified] : 2/7/2024, LV EF 65%, mild-moderate AI, mild MR, mild TR with estimated PASP of 29mmHg. 5/13/2022, LV EF 60%, mild MR, mild-moderate AI, mild TR with estimated PASP of 25mmHg. 11/2/2020, mild MR, mild-moderate AI, mild TR with estimated PASP of 42mmmHg. LVEF 60-65%.  [de-identified] : 10/2/2018, Carotid Duplex: mild non-obstructive ICA disease bilaterally.

## 2024-09-25 ENCOUNTER — APPOINTMENT (OUTPATIENT)
Dept: HEMATOLOGY ONCOLOGY | Facility: CLINIC | Age: 84
End: 2024-09-25

## 2024-09-25 ENCOUNTER — RESULT REVIEW (OUTPATIENT)
Age: 84
End: 2024-09-25

## 2024-09-25 VITALS
RESPIRATION RATE: 15 BRPM | HEART RATE: 70 BPM | SYSTOLIC BLOOD PRESSURE: 147 MMHG | TEMPERATURE: 98.2 F | OXYGEN SATURATION: 98 % | DIASTOLIC BLOOD PRESSURE: 75 MMHG

## 2024-09-25 DIAGNOSIS — C22.0 LIVER CELL CARCINOMA: ICD-10-CM

## 2024-09-25 LAB
BASOPHILS # BLD AUTO: 0.05 K/UL — SIGNIFICANT CHANGE UP (ref 0–0.2)
BASOPHILS NFR BLD AUTO: 0.9 % — SIGNIFICANT CHANGE UP (ref 0–2)
EOSINOPHIL # BLD AUTO: 0.19 K/UL — SIGNIFICANT CHANGE UP (ref 0–0.5)
EOSINOPHIL NFR BLD AUTO: 3.4 % — SIGNIFICANT CHANGE UP (ref 0–6)
HCT VFR BLD CALC: 45.9 % — HIGH (ref 34.5–45)
HGB BLD-MCNC: 15.3 G/DL — SIGNIFICANT CHANGE UP (ref 11.5–15.5)
IMM GRANULOCYTES NFR BLD AUTO: 0.2 % — SIGNIFICANT CHANGE UP (ref 0–0.9)
LYMPHOCYTES # BLD AUTO: 1.47 K/UL — SIGNIFICANT CHANGE UP (ref 1–3.3)
LYMPHOCYTES # BLD AUTO: 26 % — SIGNIFICANT CHANGE UP (ref 13–44)
MCHC RBC-ENTMCNC: 31.9 PG — SIGNIFICANT CHANGE UP (ref 27–34)
MCHC RBC-ENTMCNC: 33.3 GM/DL — SIGNIFICANT CHANGE UP (ref 32–36)
MCV RBC AUTO: 95.8 FL — SIGNIFICANT CHANGE UP (ref 80–100)
MONOCYTES # BLD AUTO: 0.42 K/UL — SIGNIFICANT CHANGE UP (ref 0–0.9)
MONOCYTES NFR BLD AUTO: 7.4 % — SIGNIFICANT CHANGE UP (ref 2–14)
NEUTROPHILS # BLD AUTO: 3.51 K/UL — SIGNIFICANT CHANGE UP (ref 1.8–7.4)
NEUTROPHILS NFR BLD AUTO: 62.1 % — SIGNIFICANT CHANGE UP (ref 43–77)
NRBC # BLD: 0 /100 WBCS — SIGNIFICANT CHANGE UP (ref 0–0)
PLATELET # BLD AUTO: 202 K/UL — SIGNIFICANT CHANGE UP (ref 150–400)
RBC # BLD: 4.79 M/UL — SIGNIFICANT CHANGE UP (ref 3.8–5.2)
RBC # FLD: 12.2 % — SIGNIFICANT CHANGE UP (ref 10.3–14.5)
WBC # BLD: 5.65 K/UL — SIGNIFICANT CHANGE UP (ref 3.8–10.5)
WBC # FLD AUTO: 5.65 K/UL — SIGNIFICANT CHANGE UP (ref 3.8–10.5)

## 2024-09-25 PROCEDURE — 99214 OFFICE O/P EST MOD 30 MIN: CPT

## 2024-09-25 PROCEDURE — G2211 COMPLEX E/M VISIT ADD ON: CPT

## 2024-09-25 PROCEDURE — 85027 COMPLETE CBC AUTOMATED: CPT

## 2024-09-25 NOTE — PHYSICAL EXAM
[Fully active, able to carry on all pre-disease performance without restriction] : Status 0 - Fully active, able to carry on all pre-disease performance without restriction [Normal] : affect appropriate [de-identified] : anicteric - - -

## 2024-09-25 NOTE — ADDENDUM
[FreeTextEntry1] : All medical record entries made by the Scribe were at my, Dr. Cornelius Barrera, direction and personally dictated by me on 09/25/2024 . I have reviewed the chart and agree that the record accurately reflects my personal performance of the history, physical exam, assessment and plan. I have also personally directed, reviewed, and agreed with the chart.    I, Carolina Fine, documented this note as a scribe on behalf of Dr. Cornelius Barrera on 09/25/2024

## 2024-09-25 NOTE — HISTORY OF PRESENT ILLNESS
[de-identified] : Ms. Echavarria was recently evaluated for renal artery stenosis. A CT scan was concerning for possible gallbladder or hepatic lesion (no BALAJI). MRI then completed at the end of May, 2022 noted a 2.6 x 2.6cm mild to moderately heterogeneously T2 hyperintense subcapsular lesion in segment 5 which bulged the capsule towards the gallbladder fossa.  Also noted were pancreatic cystic foci measuring up to 1.4 cm in the body.  Hepatic lesion felt to be indeterminate- favored to represent an adenoma but evaluation limited by motion artifact.  Neuroendocrine tumor was listed on the differential.   Ariadna reports no personal or family history of malignancy. She is up to date on age appropriate cancer screening. She reports no unintentional weight loss, no hot flashes, no skin changes, no flushing, no diarrhea. She rarely has a glass of wine and is a non-smoker.  CA 19-9, CEA and AFP were all negative , chromogranin A and serotonin levels were also normal.  Pathology from liver biopsy ([facility]) surprisingly reveals well to moderately differentiated hepatocellular carcinoma.  s/p partial hepatectomy with Dr. Rosario in Dec 2022. 3.9cm well-differentiated HCC. Negative margins. Focal areas of LVI.   [id number]. Stage II. [de-identified] : She is doing very well.  No medical changes.  She denies any ongoing fevers or chills, no headache, no lumps or bumps, no weight loss, no bleeding or bruising.  Minimal swelling on LE, and uses compression socks.  She had recent MRI with Manuel last week 9/11.  MRI was [facility].  Notable for post surgical changes, hematoma, and pancreatic cysts. Blood counts normal.  Continues to be followed by PCP Dr. Izquierdo.

## 2024-09-26 LAB
AFP-TM SERPL-MCNC: <1.8 NG/ML
ALBUMIN SERPL ELPH-MCNC: 4.1 G/DL
ALP BLD-CCNC: 72 U/L
ALT SERPL-CCNC: 16 U/L
ANION GAP SERPL CALC-SCNC: 12 MMOL/L
AST SERPL-CCNC: 18 U/L
BILIRUB SERPL-MCNC: 0.6 MG/DL
BUN SERPL-MCNC: 20 MG/DL
CALCIUM SERPL-MCNC: 9.3 MG/DL
CHLORIDE SERPL-SCNC: 102 MMOL/L
CO2 SERPL-SCNC: 29 MMOL/L
CREAT SERPL-MCNC: 0.88 MG/DL
EGFR: 65 ML/MIN/1.73M2
GLUCOSE SERPL-MCNC: 118 MG/DL
POTASSIUM SERPL-SCNC: 4.1 MMOL/L
PROT SERPL-MCNC: 6.4 G/DL
SODIUM SERPL-SCNC: 143 MMOL/L

## 2024-10-25 ENCOUNTER — APPOINTMENT (OUTPATIENT)
Dept: OPHTHALMOLOGY | Facility: CLINIC | Age: 84
End: 2024-10-25
Payer: MEDICARE

## 2024-10-25 ENCOUNTER — NON-APPOINTMENT (OUTPATIENT)
Age: 84
End: 2024-10-25

## 2024-10-25 PROCEDURE — 92014 COMPRE OPH EXAM EST PT 1/>: CPT

## 2024-11-19 ENCOUNTER — APPOINTMENT (OUTPATIENT)
Dept: DERMATOLOGY | Facility: CLINIC | Age: 84
End: 2024-11-19
Payer: MEDICARE

## 2024-11-19 PROCEDURE — 99203 OFFICE O/P NEW LOW 30 MIN: CPT | Mod: 25

## 2024-11-19 PROCEDURE — 11102 TANGNTL BX SKIN SINGLE LES: CPT

## 2024-11-27 ENCOUNTER — APPOINTMENT (OUTPATIENT)
Dept: CARDIOLOGY | Facility: CLINIC | Age: 84
End: 2024-11-27
Payer: MEDICARE

## 2024-11-27 VITALS
SYSTOLIC BLOOD PRESSURE: 152 MMHG | DIASTOLIC BLOOD PRESSURE: 72 MMHG | HEART RATE: 79 BPM | OXYGEN SATURATION: 97 % | BODY MASS INDEX: 27.78 KG/M2 | HEIGHT: 67 IN | WEIGHT: 177 LBS

## 2024-11-27 DIAGNOSIS — I65.29 OCCLUSION AND STENOSIS OF UNSPECIFIED CAROTID ARTERY: ICD-10-CM

## 2024-11-27 DIAGNOSIS — I35.1 NONRHEUMATIC AORTIC (VALVE) INSUFFICIENCY: ICD-10-CM

## 2024-11-27 DIAGNOSIS — I48.0 PAROXYSMAL ATRIAL FIBRILLATION: ICD-10-CM

## 2024-11-27 DIAGNOSIS — I10 ESSENTIAL (PRIMARY) HYPERTENSION: ICD-10-CM

## 2024-11-27 DIAGNOSIS — E78.5 HYPERLIPIDEMIA, UNSPECIFIED: ICD-10-CM

## 2024-11-27 PROCEDURE — 93242 EXT ECG>48HR<7D RECORDING: CPT

## 2024-11-27 PROCEDURE — 99214 OFFICE O/P EST MOD 30 MIN: CPT

## 2024-11-27 PROCEDURE — G2211 COMPLEX E/M VISIT ADD ON: CPT

## 2024-11-27 RX ORDER — APIXABAN 5 MG/1
5 TABLET, FILM COATED ORAL
Qty: 90 | Refills: 3 | Status: ACTIVE | COMMUNITY
Start: 2024-11-27 | End: 1900-01-01

## 2024-12-12 PROCEDURE — 93244 EXT ECG>48HR<7D REV&INTERPJ: CPT

## 2025-01-08 ENCOUNTER — APPOINTMENT (OUTPATIENT)
Dept: CARDIOLOGY | Facility: CLINIC | Age: 85
End: 2025-01-08
Payer: MEDICARE

## 2025-01-08 VITALS
BODY MASS INDEX: 26.68 KG/M2 | HEIGHT: 67 IN | HEART RATE: 75 BPM | DIASTOLIC BLOOD PRESSURE: 92 MMHG | WEIGHT: 170 LBS | SYSTOLIC BLOOD PRESSURE: 168 MMHG | OXYGEN SATURATION: 95 %

## 2025-01-08 DIAGNOSIS — I35.1 NONRHEUMATIC AORTIC (VALVE) INSUFFICIENCY: ICD-10-CM

## 2025-01-08 DIAGNOSIS — I10 ESSENTIAL (PRIMARY) HYPERTENSION: ICD-10-CM

## 2025-01-08 DIAGNOSIS — I48.0 PAROXYSMAL ATRIAL FIBRILLATION: ICD-10-CM

## 2025-01-08 DIAGNOSIS — I34.0 NONRHEUMATIC MITRAL (VALVE) INSUFFICIENCY: ICD-10-CM

## 2025-01-08 DIAGNOSIS — E78.5 HYPERLIPIDEMIA, UNSPECIFIED: ICD-10-CM

## 2025-01-08 PROCEDURE — G2211 COMPLEX E/M VISIT ADD ON: CPT

## 2025-01-08 PROCEDURE — 99214 OFFICE O/P EST MOD 30 MIN: CPT

## 2025-01-08 PROCEDURE — 93306 TTE W/DOPPLER COMPLETE: CPT

## 2025-01-08 RX ORDER — HYDRALAZINE HYDROCHLORIDE 50 MG/1
50 TABLET ORAL 3 TIMES DAILY
Qty: 270 | Refills: 3 | Status: ACTIVE | COMMUNITY
Start: 2025-01-08 | End: 1900-01-01

## 2025-02-19 ENCOUNTER — NON-APPOINTMENT (OUTPATIENT)
Age: 85
End: 2025-02-19

## 2025-02-20 ENCOUNTER — APPOINTMENT (OUTPATIENT)
Dept: CARDIOLOGY | Facility: CLINIC | Age: 85
End: 2025-02-20
Payer: MEDICARE

## 2025-02-20 VITALS
HEART RATE: 65 BPM | HEIGHT: 67 IN | OXYGEN SATURATION: 98 % | DIASTOLIC BLOOD PRESSURE: 78 MMHG | BODY MASS INDEX: 28.25 KG/M2 | SYSTOLIC BLOOD PRESSURE: 142 MMHG | WEIGHT: 180 LBS

## 2025-02-20 DIAGNOSIS — E78.5 HYPERLIPIDEMIA, UNSPECIFIED: ICD-10-CM

## 2025-02-20 DIAGNOSIS — I10 ESSENTIAL (PRIMARY) HYPERTENSION: ICD-10-CM

## 2025-02-20 DIAGNOSIS — I35.1 NONRHEUMATIC AORTIC (VALVE) INSUFFICIENCY: ICD-10-CM

## 2025-02-20 DIAGNOSIS — I34.0 NONRHEUMATIC MITRAL (VALVE) INSUFFICIENCY: ICD-10-CM

## 2025-02-20 PROCEDURE — 99214 OFFICE O/P EST MOD 30 MIN: CPT

## 2025-02-20 PROCEDURE — G2211 COMPLEX E/M VISIT ADD ON: CPT

## 2025-03-17 ENCOUNTER — OUTPATIENT (OUTPATIENT)
Dept: OUTPATIENT SERVICES | Facility: HOSPITAL | Age: 85
LOS: 1 days | End: 2025-03-17

## 2025-03-17 DIAGNOSIS — Z98.49 CATARACT EXTRACTION STATUS, UNSPECIFIED EYE: Chronic | ICD-10-CM

## 2025-03-17 DIAGNOSIS — K76.89 OTHER SPECIFIED DISEASES OF LIVER: ICD-10-CM

## 2025-03-17 DIAGNOSIS — Z98.890 OTHER SPECIFIED POSTPROCEDURAL STATES: Chronic | ICD-10-CM

## 2025-03-20 ENCOUNTER — APPOINTMENT (OUTPATIENT)
Dept: CARDIOLOGY | Facility: CLINIC | Age: 85
End: 2025-03-20

## 2025-03-24 ENCOUNTER — RESULT REVIEW (OUTPATIENT)
Age: 85
End: 2025-03-24

## 2025-03-24 ENCOUNTER — APPOINTMENT (OUTPATIENT)
Dept: HEMATOLOGY ONCOLOGY | Facility: CLINIC | Age: 85
End: 2025-03-24
Payer: MEDICARE

## 2025-03-24 VITALS
WEIGHT: 180 LBS | OXYGEN SATURATION: 94 % | TEMPERATURE: 96.8 F | DIASTOLIC BLOOD PRESSURE: 92 MMHG | HEART RATE: 63 BPM | SYSTOLIC BLOOD PRESSURE: 191 MMHG | BODY MASS INDEX: 28.19 KG/M2

## 2025-03-24 DIAGNOSIS — C22.0 LIVER CELL CARCINOMA: ICD-10-CM

## 2025-03-24 LAB
BASOPHILS # BLD AUTO: 0.03 K/UL — SIGNIFICANT CHANGE UP (ref 0–0.2)
BASOPHILS NFR BLD AUTO: 0.6 % — SIGNIFICANT CHANGE UP (ref 0–2)
EOSINOPHIL # BLD AUTO: 0.13 K/UL — SIGNIFICANT CHANGE UP (ref 0–0.5)
EOSINOPHIL NFR BLD AUTO: 2.8 % — SIGNIFICANT CHANGE UP (ref 0–6)
HCT VFR BLD CALC: 43.7 % — SIGNIFICANT CHANGE UP (ref 34.5–45)
HGB BLD-MCNC: 14.3 G/DL — SIGNIFICANT CHANGE UP (ref 11.5–15.5)
IMM GRANULOCYTES NFR BLD AUTO: 0.4 % — SIGNIFICANT CHANGE UP (ref 0–0.9)
LYMPHOCYTES # BLD AUTO: 1.06 K/UL — SIGNIFICANT CHANGE UP (ref 1–3.3)
LYMPHOCYTES # BLD AUTO: 22.7 % — SIGNIFICANT CHANGE UP (ref 13–44)
MCHC RBC-ENTMCNC: 31.5 PG — SIGNIFICANT CHANGE UP (ref 27–34)
MCHC RBC-ENTMCNC: 32.7 G/DL — SIGNIFICANT CHANGE UP (ref 32–36)
MCV RBC AUTO: 96.3 FL — SIGNIFICANT CHANGE UP (ref 80–100)
MONOCYTES # BLD AUTO: 0.58 K/UL — SIGNIFICANT CHANGE UP (ref 0–0.9)
MONOCYTES NFR BLD AUTO: 12.4 % — SIGNIFICANT CHANGE UP (ref 2–14)
NEUTROPHILS # BLD AUTO: 2.85 K/UL — SIGNIFICANT CHANGE UP (ref 1.8–7.4)
NEUTROPHILS NFR BLD AUTO: 61.1 % — SIGNIFICANT CHANGE UP (ref 43–77)
NRBC BLD AUTO-RTO: 0 /100 WBCS — SIGNIFICANT CHANGE UP (ref 0–0)
PLATELET # BLD AUTO: 206 K/UL — SIGNIFICANT CHANGE UP (ref 150–400)
RBC # BLD: 4.54 M/UL — SIGNIFICANT CHANGE UP (ref 3.8–5.2)
RBC # FLD: 12.7 % — SIGNIFICANT CHANGE UP (ref 10.3–14.5)
WBC # BLD: 4.67 K/UL — SIGNIFICANT CHANGE UP (ref 3.8–10.5)
WBC # FLD AUTO: 4.67 K/UL — SIGNIFICANT CHANGE UP (ref 3.8–10.5)

## 2025-03-24 PROCEDURE — G2211 COMPLEX E/M VISIT ADD ON: CPT

## 2025-03-24 PROCEDURE — 85027 COMPLETE CBC AUTOMATED: CPT

## 2025-03-24 PROCEDURE — 99214 OFFICE O/P EST MOD 30 MIN: CPT

## 2025-03-25 LAB
AFP-TM SERPL-MCNC: 2.3 NG/ML
ALBUMIN SERPL ELPH-MCNC: 4.2 G/DL
ALP BLD-CCNC: 68 U/L
ALT SERPL-CCNC: 11 U/L
ANION GAP SERPL CALC-SCNC: 10 MMOL/L
AST SERPL-CCNC: 16 U/L
BILIRUB SERPL-MCNC: 0.6 MG/DL
BUN SERPL-MCNC: 20 MG/DL
CALCIUM SERPL-MCNC: 9.8 MG/DL
CHLORIDE SERPL-SCNC: 103 MMOL/L
CO2 SERPL-SCNC: 28 MMOL/L
CREAT SERPL-MCNC: 0.89 MG/DL
EGFRCR SERPLBLD CKD-EPI 2021: 64 ML/MIN/1.73M2
GLUCOSE SERPL-MCNC: 96 MG/DL
POTASSIUM SERPL-SCNC: 4.4 MMOL/L
PROT SERPL-MCNC: 6.5 G/DL
SODIUM SERPL-SCNC: 141 MMOL/L

## 2025-03-26 ENCOUNTER — APPOINTMENT (OUTPATIENT)
Dept: CARDIOLOGY | Facility: CLINIC | Age: 85
End: 2025-03-26
Payer: MEDICARE

## 2025-03-26 VITALS
SYSTOLIC BLOOD PRESSURE: 170 MMHG | HEIGHT: 67 IN | DIASTOLIC BLOOD PRESSURE: 114 MMHG | OXYGEN SATURATION: 95 % | HEART RATE: 64 BPM | WEIGHT: 180 LBS | BODY MASS INDEX: 28.25 KG/M2

## 2025-03-26 DIAGNOSIS — I35.1 NONRHEUMATIC AORTIC (VALVE) INSUFFICIENCY: ICD-10-CM

## 2025-03-26 DIAGNOSIS — I34.0 NONRHEUMATIC MITRAL (VALVE) INSUFFICIENCY: ICD-10-CM

## 2025-03-26 DIAGNOSIS — I10 ESSENTIAL (PRIMARY) HYPERTENSION: ICD-10-CM

## 2025-03-26 DIAGNOSIS — E78.5 HYPERLIPIDEMIA, UNSPECIFIED: ICD-10-CM

## 2025-03-26 PROCEDURE — G2211 COMPLEX E/M VISIT ADD ON: CPT

## 2025-03-26 PROCEDURE — 99214 OFFICE O/P EST MOD 30 MIN: CPT

## 2025-03-26 RX ORDER — NIFEDIPINE 60 MG/1
60 TABLET, EXTENDED RELEASE ORAL DAILY
Qty: 90 | Refills: 3 | Status: ACTIVE | COMMUNITY
Start: 2025-03-26 | End: 1900-01-01

## 2025-04-16 ENCOUNTER — TRANSCRIPTION ENCOUNTER (OUTPATIENT)
Age: 85
End: 2025-04-16

## 2025-05-28 ENCOUNTER — NON-APPOINTMENT (OUTPATIENT)
Age: 85
End: 2025-05-28

## 2025-05-28 ENCOUNTER — APPOINTMENT (OUTPATIENT)
Dept: CARDIOLOGY | Facility: CLINIC | Age: 85
End: 2025-05-28
Payer: MEDICARE

## 2025-05-28 VITALS
WEIGHT: 180 LBS | SYSTOLIC BLOOD PRESSURE: 136 MMHG | HEIGHT: 67 IN | DIASTOLIC BLOOD PRESSURE: 82 MMHG | OXYGEN SATURATION: 96 % | HEART RATE: 76 BPM | BODY MASS INDEX: 28.25 KG/M2

## 2025-05-28 DIAGNOSIS — I10 ESSENTIAL (PRIMARY) HYPERTENSION: ICD-10-CM

## 2025-05-28 DIAGNOSIS — I34.0 NONRHEUMATIC MITRAL (VALVE) INSUFFICIENCY: ICD-10-CM

## 2025-05-28 DIAGNOSIS — E78.5 HYPERLIPIDEMIA, UNSPECIFIED: ICD-10-CM

## 2025-05-28 DIAGNOSIS — I35.1 NONRHEUMATIC AORTIC (VALVE) INSUFFICIENCY: ICD-10-CM

## 2025-05-28 PROCEDURE — 99214 OFFICE O/P EST MOD 30 MIN: CPT

## 2025-05-28 PROCEDURE — 93000 ELECTROCARDIOGRAM COMPLETE: CPT

## 2025-05-28 PROCEDURE — G2211 COMPLEX E/M VISIT ADD ON: CPT

## 2025-06-11 ENCOUNTER — TRANSCRIPTION ENCOUNTER (OUTPATIENT)
Age: 85
End: 2025-06-11

## 2025-07-16 NOTE — REASON FOR VISIT
MTP #2:   [id number]- verified with Rufina LEAL RN.    [Follow-Up Visit] : a follow-up [FreeTextEntry2] : Liver Lesion

## 2025-08-20 ENCOUNTER — RX RENEWAL (OUTPATIENT)
Age: 85
End: 2025-08-20

## 2025-08-26 PROBLEM — R60.0 LOWER EXTREMITY EDEMA: Status: ACTIVE | Noted: 2025-08-26

## 2025-09-03 ENCOUNTER — APPOINTMENT (OUTPATIENT)
Dept: CARDIOLOGY | Facility: CLINIC | Age: 85
End: 2025-09-03
Payer: MEDICARE

## 2025-09-03 VITALS
BODY MASS INDEX: 27.7 KG/M2 | OXYGEN SATURATION: 98 % | HEART RATE: 75 BPM | DIASTOLIC BLOOD PRESSURE: 52 MMHG | HEIGHT: 67 IN | WEIGHT: 176.5 LBS | SYSTOLIC BLOOD PRESSURE: 146 MMHG

## 2025-09-03 DIAGNOSIS — I48.0 PAROXYSMAL ATRIAL FIBRILLATION: ICD-10-CM

## 2025-09-03 DIAGNOSIS — I10 ESSENTIAL (PRIMARY) HYPERTENSION: ICD-10-CM

## 2025-09-03 DIAGNOSIS — R60.0 LOCALIZED EDEMA: ICD-10-CM

## 2025-09-03 DIAGNOSIS — I34.0 NONRHEUMATIC MITRAL (VALVE) INSUFFICIENCY: ICD-10-CM

## 2025-09-03 DIAGNOSIS — E78.5 HYPERLIPIDEMIA, UNSPECIFIED: ICD-10-CM

## 2025-09-03 PROCEDURE — 99214 OFFICE O/P EST MOD 30 MIN: CPT

## 2025-09-03 PROCEDURE — G2211 COMPLEX E/M VISIT ADD ON: CPT

## 2025-09-03 RX ORDER — ASPIRIN 81 MG
81 TABLET, DELAYED RELEASE (ENTERIC COATED) ORAL DAILY
Refills: 0 | Status: ACTIVE | COMMUNITY

## 2025-09-03 RX ORDER — SPIRONOLACTONE 50 MG/1
50 TABLET ORAL DAILY
Qty: 90 | Refills: 2 | Status: ACTIVE | COMMUNITY
Start: 2025-09-03 | End: 1900-01-01

## 2025-09-17 ENCOUNTER — APPOINTMENT (OUTPATIENT)
Dept: CARDIOLOGY | Facility: CLINIC | Age: 85
End: 2025-09-17
Payer: MEDICARE

## 2025-09-17 VITALS
HEIGHT: 67 IN | BODY MASS INDEX: 27.53 KG/M2 | OXYGEN SATURATION: 96 % | DIASTOLIC BLOOD PRESSURE: 64 MMHG | SYSTOLIC BLOOD PRESSURE: 164 MMHG | HEART RATE: 75 BPM | WEIGHT: 175.38 LBS

## 2025-09-17 DIAGNOSIS — E78.5 HYPERLIPIDEMIA, UNSPECIFIED: ICD-10-CM

## 2025-09-17 DIAGNOSIS — I65.29 OCCLUSION AND STENOSIS OF UNSPECIFIED CAROTID ARTERY: ICD-10-CM

## 2025-09-17 DIAGNOSIS — R60.0 LOCALIZED EDEMA: ICD-10-CM

## 2025-09-17 DIAGNOSIS — I48.0 PAROXYSMAL ATRIAL FIBRILLATION: ICD-10-CM

## 2025-09-17 DIAGNOSIS — I10 ESSENTIAL (PRIMARY) HYPERTENSION: ICD-10-CM

## 2025-09-17 DIAGNOSIS — I35.1 NONRHEUMATIC AORTIC (VALVE) INSUFFICIENCY: ICD-10-CM

## 2025-09-17 DIAGNOSIS — I34.0 NONRHEUMATIC MITRAL (VALVE) INSUFFICIENCY: ICD-10-CM

## 2025-09-17 PROCEDURE — G2211 COMPLEX E/M VISIT ADD ON: CPT

## 2025-09-17 PROCEDURE — 99214 OFFICE O/P EST MOD 30 MIN: CPT

## 2025-09-17 RX ORDER — RIVAROXABAN 20 MG/1
20 TABLET, FILM COATED ORAL
Qty: 90 | Refills: 3 | Status: ACTIVE | COMMUNITY
Start: 2025-09-17 | End: 1900-01-01